# Patient Record
Sex: MALE | NOT HISPANIC OR LATINO | Employment: FULL TIME | URBAN - METROPOLITAN AREA
[De-identification: names, ages, dates, MRNs, and addresses within clinical notes are randomized per-mention and may not be internally consistent; named-entity substitution may affect disease eponyms.]

---

## 2024-03-27 NOTE — PROGRESS NOTES
There are no diagnoses linked to this encounter.   Rectal pain  Patient presents for evaluation of rectal pain.  Patient notes that sometimes for up to a few hours after bowel movements he has some degree of rectal pain and discomfort.  He describes this as a dull ache.  This goes away with time.  This is exacerbated by having bowel movements.  Patient notes occasional bleeding but this is quite minimal.  Also notes intermittent burning and itching with wiping.  Examination is largely unremarkable.  He does have some levator level tenderness.  No gross mass lesions noted on examination to include anoscopy.    In terms of his symptoms, it is difficult to tell if he has a combination of pruritic and pelvic floor symptoms.  He has no surgically correctable lesion on today's exam.  Given it has been a a while since his most recent colonoscopy, this is recommended to rule out more proximal pathology.  I have also offered him topical Calmoseptine for his perianal symptoms of burning and itching.  Risks and benefits of colonoscopy reviewed with patient to include, but not be limited to: anesthesia, bleeding, missed lesion and perforation requiring surgery.  Patient consents to procedure.      MARVEL  Angel Luis Hamilton is a New patent here for Rectal pain that he has had for a few months after bowel movement. He reports 1 bowel movement daily that is soft and formed     His last colonoscopy was 10 yrs ago.      No past medical history on file.  No past surgical history on file.    Current Outpatient Medications:     hydroCHLOROthiazide 25 mg tablet, Take 25 mg by mouth daily, Disp: , Rfl:     losartan (COZAAR) 50 mg tablet, Take 50 mg by mouth daily, Disp: , Rfl:     pantoprazole (PROTONIX) 40 mg tablet, Take 40 mg by mouth daily, Disp: , Rfl:   Allergies as of 04/01/2024    (No Known Allergies)     Review of Systems   Gastrointestinal:  Positive for rectal pain.   All other systems reviewed and are negative.    There were no  vitals filed for this visit.  Physical Exam  Constitutional:       Appearance: Normal appearance.   HENT:      Head: Normocephalic and atraumatic.      Mouth/Throat:      Mouth: Mucous membranes are moist.   Eyes:      Extraocular Movements: Extraocular movements intact.      Pupils: Pupils are equal, round, and reactive to light.   Pulmonary:      Effort: Pulmonary effort is normal.   Musculoskeletal:         General: Normal range of motion.   Skin:     General: Skin is warm and dry.   Neurological:      General: No focal deficit present.      Mental Status: He is alert and oriented to person, place, and time.   Psychiatric:         Mood and Affect: Mood normal.         Behavior: Behavior normal.         Thought Content: Thought content normal.         Judgment: Judgment normal.     Lower Endoscopy    Date/Time: 4/1/2024 10:40 AM    Performed by: Magdaleno Holguin MD  Authorized by: Magdaleno Holguin MD    Verbal consent obtained?: Yes    Risks and benefits: Risks, benefits and alternatives were discussed    Consent given by:  Patient  Indications: rectal irritation and rectal pain    Patient sedated: No    Scope type:  Anoscope  External exam performed: Yes    Pilonidal sinus tract: No    Pilonidal cyst: No    Pilonidal tenderness: No    Perianal skin tags: No    Perirectal warts: No    Perianal maceration: Yes    Perianal induration: No    Perianal erythema: No    External hemorrhoids: No    Digital exam performed: Yes    Laxity of anal sphincter: No    Prostate tenderness: No    Internal hemorrhoids: Yes    Prolapsed: No    Intraluminal mass: No    Inflammation: No    Anal fissures: No    Anal fistulae: No    Anal stricture: No    Abscess: No    Procedure termination:  Procedure complete  Patient tolerance:  Patient tolerated the procedure well with no immediate complications

## 2024-04-01 ENCOUNTER — PREP FOR PROCEDURE (OUTPATIENT)
Age: 64
End: 2024-04-01

## 2024-04-01 ENCOUNTER — OFFICE VISIT (OUTPATIENT)
Age: 64
End: 2024-04-01
Payer: COMMERCIAL

## 2024-04-01 ENCOUNTER — TELEPHONE (OUTPATIENT)
Age: 64
End: 2024-04-01

## 2024-04-01 VITALS — BODY MASS INDEX: 31.36 KG/M2 | HEIGHT: 71 IN | WEIGHT: 224 LBS

## 2024-04-01 DIAGNOSIS — K62.89 RECTAL PAIN: Primary | ICD-10-CM

## 2024-04-01 PROCEDURE — 99243 OFF/OP CNSLTJ NEW/EST LOW 30: CPT | Performed by: COLON & RECTAL SURGERY

## 2024-04-01 PROCEDURE — 46600 DIAGNOSTIC ANOSCOPY SPX: CPT | Performed by: COLON & RECTAL SURGERY

## 2024-04-01 RX ORDER — PANTOPRAZOLE SODIUM 40 MG/1
40 TABLET, DELAYED RELEASE ORAL DAILY
COMMUNITY

## 2024-04-01 RX ORDER — HYDROCHLOROTHIAZIDE 25 MG/1
25 TABLET ORAL DAILY
COMMUNITY

## 2024-04-01 RX ORDER — LOSARTAN POTASSIUM 50 MG/1
50 TABLET ORAL DAILY
COMMUNITY

## 2024-04-01 NOTE — ASSESSMENT & PLAN NOTE
Patient presents for evaluation of rectal pain.  Patient notes that sometimes for up to a few hours after bowel movements he has some degree of rectal pain and discomfort.  He describes this as a dull ache.  This goes away with time.  This is exacerbated by having bowel movements.  Patient notes occasional bleeding but this is quite minimal.  Also notes intermittent burning and itching with wiping.  Examination is largely unremarkable.  He does have some levator level tenderness.  No gross mass lesions noted on examination to include anoscopy.    In terms of his symptoms, it is difficult to tell if he has a combination of pruritic and pelvic floor symptoms.  He has no surgically correctable lesion on today's exam.  Given it has been a a while since his most recent colonoscopy, this is recommended to rule out more proximal pathology.  I have also offered him topical Calmoseptine for his perianal symptoms of burning and itching.  Risks and benefits of colonoscopy reviewed with patient to include, but not be limited to: anesthesia, bleeding, missed lesion and perforation requiring surgery.  Patient consents to procedure.

## 2024-04-01 NOTE — TELEPHONE ENCOUNTER
DB OV 4/1 rectal pain, last fc 10 yrs ago, BMI 31       Scheduled DB 4/15 TREC   Handed PW to patient

## 2024-04-01 NOTE — LETTER
Angel Luis Qaqish  [address] [address]        Your Colonoscopy Procedure has been scheduled at:    [facility] [place]  [address] [address]      The Date of your Procedure is: April 15, 2024    The Facility will call you 1-2 days prior to your procedure with your arrival time.    Dr. Magdaleno Holguin  will be performing the procedure.   Please bring to your procedure at [facility]:              1. Insurance Cards and referrals if required by your insurance company              2. Valid Photo ID              3. Power of  Form if required              4. Current list of medications with dose, route, and frequency.     Use the bowel preparation as directed.    Check with your family doctor if you are taking a blood thinner (Coumadin, Plavix, Xarelto, Pradaxa, Gingko biloba, Ginseng, Feverfew, Ceci's Wort).  We suggest stopping these for 3 days.  Special instructions may be needed if you are taking aspirin or any aspirin-containing medication.  Check with your family physician.    If you are on DIABETIC MEDICATION (tablets or insulin) your doctor may make changes in your preparation.      Feel free to call the physician's office to answer any questions or address any concerns you have regarding your procedure or preparation.  A nurse will be happy to assist you.      Because anesthesia is given to you during the procedure, the center requires that you be discharged under supervision of an adult to take you home after the procedure is performed.  Public Transportation such as VAST, BUS, TAXI is Not Acceptable.    It is important you notify our office of any insurance changes prior to your procedure and, if necessary, supply us with referrals from your primary care physician.                  COLONOSCOPY PREPARATION INSTRUCTIONS    Purchase (prescription not required):  · 238 gram bottle of Miralax® (Glycolax®)  · 4 Dulcolax® (Bisacodyl) Laxative  Tablets  · 64 oz. bottle of Gatorade® or your preference of a non-carbonated clear liquid - NOT RED OR PURPLE     One Day Prior to Colonoscopy Procedure  · Nothing to eat the day before your procedure, only clear liquids.  · It is important that you drink plenty of clear liquids throughout the day to prevent dehydration.    Clear Liquids include:  o Water/Iced Tea/Lemonade/Gatorade®/Black Coffee or tea (no milk or creamers  o Soft drinks: orange, ginger ale, cola, Pepsi®, Sprite®, 7Up®  o Polo-Aid® (lemonade or orange flavors only)  o Strained fruit juices without pulp such as apple, white grape, white cranberry  o Jell-O®, lemon, lime or orange (no fruit or toppings)  o Popsicles, Italian Ice (No Ice Cream, sherbets, or fruit bars)  o Chicken or beef bouillon/broth  DO NOT EAT OR DRINK ANYTHING RED OR PURPLE  DO NOT DRINK ANY ALCOHOLIC BEVERAGES  DIABETIC PATIENTS: Consult your physician    At 4:00 pm, take (2) Dulcolax® (Bisacodyl) Laxative Tablets. Swallow the tablets whole with an 8 oz. glass of water. At 8:00 pm, take the additional (2) Dulcolax® (Bisacodyl) Laxative Tablets with 8 oz. of water. The package may direct you not to exceed (2) tablets at any time but for the purpose of this examination, you should take (4) total.    Mix the 238 gm of Miralax® in 64 oz. of Gatorade® and shake the solution until the Miralax® is dissolved. You will drink half (32 oz) of this solution this evening, beginning between 4 and 6 o'clock. Drink 8 oz glassfuls at your own pace. It may take several hours to drink the solution.    Remember to stay close to toilet facilities.    DAY OF COLONOSCOPY PROCEDURE    Five (5) hours before your procedure, drink the other half (32 oz) of the Miralax®/Gatorade® mixture within a two (2) hour period. This may require you to get up very early if you are scheduled for an early procedure.    NOTHING IS TO BE TAKEN BY MOUTH 3 HOURS PRIOR TO PROCEDURE.     If you use an inhaler, please bring it  with you to your procedure.

## 2024-04-03 ENCOUNTER — ANESTHESIA EVENT (OUTPATIENT)
Dept: ANESTHESIOLOGY | Facility: AMBULATORY SURGERY CENTER | Age: 64
End: 2024-04-03

## 2024-04-03 ENCOUNTER — ANESTHESIA (OUTPATIENT)
Dept: ANESTHESIOLOGY | Facility: AMBULATORY SURGERY CENTER | Age: 64
End: 2024-04-03

## 2024-04-13 ENCOUNTER — ANESTHESIA (OUTPATIENT)
Dept: GASTROENTEROLOGY | Facility: AMBULATORY SURGERY CENTER | Age: 64
End: 2024-04-13

## 2024-04-13 ENCOUNTER — ANESTHESIA EVENT (OUTPATIENT)
Dept: GASTROENTEROLOGY | Facility: AMBULATORY SURGERY CENTER | Age: 64
End: 2024-04-13

## 2024-04-14 NOTE — ANESTHESIA PREPROCEDURE EVALUATION
Procedure:  PRE-OP ONLY    Relevant Problems   No relevant active problems      Hx rectal pain  BMI 31  HTN  GERD         Anesthesia Plan  ASA Score- 2     Anesthesia Type- IV sedation with anesthesia with ASA Monitors.         Additional Monitors:     Airway Plan:            Plan Factors-    Chart reviewed.    Patient summary reviewed.    Patient is not a current smoker.              Induction- intravenous.    Postoperative Plan-     Informed Consent-

## 2024-04-15 ENCOUNTER — HOSPITAL ENCOUNTER (OUTPATIENT)
Dept: GASTROENTEROLOGY | Facility: AMBULATORY SURGERY CENTER | Age: 64
Discharge: HOME | End: 2024-04-15
Attending: COLON & RECTAL SURGERY
Payer: COMMERCIAL

## 2024-04-15 ENCOUNTER — ANESTHESIA EVENT (OUTPATIENT)
Dept: GASTROENTEROLOGY | Facility: AMBULATORY SURGERY CENTER | Age: 64
End: 2024-04-15

## 2024-04-15 ENCOUNTER — ANESTHESIA (OUTPATIENT)
Dept: GASTROENTEROLOGY | Facility: AMBULATORY SURGERY CENTER | Age: 64
End: 2024-04-15

## 2024-04-15 VITALS
SYSTOLIC BLOOD PRESSURE: 141 MMHG | WEIGHT: 225 LBS | HEIGHT: 71 IN | HEART RATE: 63 BPM | TEMPERATURE: 97 F | BODY MASS INDEX: 31.5 KG/M2 | OXYGEN SATURATION: 98 % | DIASTOLIC BLOOD PRESSURE: 85 MMHG | RESPIRATION RATE: 18 BRPM

## 2024-04-15 DIAGNOSIS — K62.89 RECTAL PAIN: ICD-10-CM

## 2024-04-15 PROCEDURE — 45380 COLONOSCOPY AND BIOPSY: CPT | Performed by: COLON & RECTAL SURGERY

## 2024-04-15 PROCEDURE — 88305 TISSUE EXAM BY PATHOLOGIST: CPT | Performed by: PATHOLOGY

## 2024-04-15 RX ORDER — SODIUM CHLORIDE, SODIUM LACTATE, POTASSIUM CHLORIDE, CALCIUM CHLORIDE 600; 310; 30; 20 MG/100ML; MG/100ML; MG/100ML; MG/100ML
INJECTION, SOLUTION INTRAVENOUS CONTINUOUS PRN
Status: DISCONTINUED | OUTPATIENT
Start: 2024-04-15 | End: 2024-04-15

## 2024-04-15 RX ORDER — LIDOCAINE HYDROCHLORIDE 20 MG/ML
INJECTION, SOLUTION EPIDURAL; INFILTRATION; INTRACAUDAL; PERINEURAL AS NEEDED
Status: DISCONTINUED | OUTPATIENT
Start: 2024-04-15 | End: 2024-04-15

## 2024-04-15 RX ORDER — PROPOFOL 10 MG/ML
INJECTION, EMULSION INTRAVENOUS AS NEEDED
Status: DISCONTINUED | OUTPATIENT
Start: 2024-04-15 | End: 2024-04-15

## 2024-04-15 RX ADMIN — PROPOFOL 100 MG: 10 INJECTION, EMULSION INTRAVENOUS at 07:25

## 2024-04-15 RX ADMIN — PROPOFOL 50 MG: 10 INJECTION, EMULSION INTRAVENOUS at 07:28

## 2024-04-15 RX ADMIN — PROPOFOL 20 MG: 10 INJECTION, EMULSION INTRAVENOUS at 07:34

## 2024-04-15 RX ADMIN — PROPOFOL 30 MG: 10 INJECTION, EMULSION INTRAVENOUS at 07:31

## 2024-04-15 RX ADMIN — LIDOCAINE HYDROCHLORIDE 100 MG: 20 INJECTION, SOLUTION EPIDURAL; INFILTRATION; INTRACAUDAL; PERINEURAL at 07:25

## 2024-04-15 RX ADMIN — SODIUM CHLORIDE, SODIUM LACTATE, POTASSIUM CHLORIDE, CALCIUM CHLORIDE: 600; 310; 30; 20 INJECTION, SOLUTION INTRAVENOUS at 07:22

## 2024-04-15 NOTE — ANESTHESIA PREPROCEDURE EVALUATION
Procedure:  COLONOSCOPY    Relevant Problems   No relevant active problems      Hx rectal pain  BMI 31  HTN  GERD    Physical Exam    Airway    Mallampati score: III  TM Distance: >3 FB  Neck ROM: full     Dental   No notable dental hx     Cardiovascular      Pulmonary      Other Findings        Anesthesia Plan  ASA Score- 2     Anesthesia Type- IV sedation with anesthesia with ASA Monitors.         Additional Monitors:     Airway Plan:            Plan Factors-    Chart reviewed.    Patient summary reviewed.    Patient is not a current smoker.              Induction- intravenous.    Postoperative Plan-     Informed Consent- Anesthetic plan and risks discussed with patient.  I personally reviewed this patient with the CRNA. Discussed and agreed on the Anesthesia Plan with the CRNA..

## 2024-04-15 NOTE — ANESTHESIA POSTPROCEDURE EVALUATION
Post-Op Assessment Note    CV Status:  Stable    Pain management: adequate       Mental Status:  Sleepy   Hydration Status:  Euvolemic   PONV Controlled:  Controlled   Airway Patency:  Patent     Post Op Vitals Reviewed: Yes    No anethesia notable event occurred.    Staff: CRNA               BP   120/70   Temp   98   Pulse  64   Resp   16   SpO2   98%

## 2024-04-15 NOTE — H&P
"History and Physical   Colon and Rectal Surgery   Angel Luis Hamilton 63 y.o. male MRN: [id number]  Unit/Bed#:  Encounter: [id number]  04/15/24   @NOW    No chief complaint on file.        History of Present Illness   HPI:  Angel Luis Hamilton is a 63 y.o. male who presents for evaluation of rectal pain.      Historical Information   Past Medical History:   Diagnosis Date    GERD (gastroesophageal reflux disease)     Hypertension     Rectal bleeding      Past Surgical History:   Procedure Laterality Date    CERVICAL DISC SURGERY      HEMORROIDECTOMY      ORIF TOE FRACTURE Right        Meds/Allergies     (Not in a hospital admission)        Current Outpatient Medications:     hydroCHLOROthiazide 25 mg tablet, Take 25 mg by mouth daily, Disp: , Rfl:     losartan (COZAAR) 50 mg tablet, Take 50 mg by mouth daily, Disp: , Rfl:     pantoprazole (PROTONIX) 40 mg tablet, Take 40 mg by mouth daily, Disp: , Rfl:     No Known Allergies      Social History   Social History     Substance and Sexual Activity   Alcohol Use Yes    Alcohol/week: 7.0 standard drinks of alcohol    Types: 7 Shots of liquor per week     Social History     Substance and Sexual Activity   Drug Use Never     Social History     Tobacco Use   Smoking Status Former    Current packs/day: 2.00    Types: Cigarettes, Cigars   Smokeless Tobacco Never   Tobacco Comments    Quit 16 years ago         Family History: History reviewed. No pertinent family history.      Objective     Current Vitals:   Blood Pressure: 158/85 (04/15/24 0710)  Pulse: 66 (nsr) (04/15/24 0710)  Temperature: (!) 97 °F (36.1 °C) (04/15/24 0710)  Temp Source: Skin (04/15/24 0710)  Respirations: 18 (04/15/24 0710)  Height: 5' 11\" (180.3 cm) (04/15/24 0710)  Weight - Scale: 102 kg (225 lb) (04/15/24 0710)  SpO2: 99 % (04/15/24 0710)  No intake or output data in the 24 hours ending 04/15/24 0719    Physical Exam:  General:  Resting comfortably in bed   Eyes:Sclera anicteric  ENT: Trachea midline  Pulm:  " Symmetric chest raise.  No respiratory Distress  CV:  Regular on monitor  Abdomen:  Soft NT ND  Extremities:  No clubbing/ cyanosis/ edema    Lab Results: I have personally reviewed pertinent lab results.    Imaging: I have personally reviewed pertinent reports.        ASSESSMENT:  Angel Luis Hamilton is a 63 y.o. male who presents for outpatient colonoscopy.      PLAN:  For colonoscopy    Risks/ Benefits reviewed to include but not limited to anesthesia, bleeding, missed lesions, and colonoscopic perforation requiring surgery.

## 2024-04-18 PROCEDURE — 88305 TISSUE EXAM BY PATHOLOGIST: CPT | Performed by: PATHOLOGY

## 2024-05-17 ENCOUNTER — OFFICE VISIT (OUTPATIENT)
Age: 64
End: 2024-05-17
Payer: COMMERCIAL

## 2024-05-17 VITALS
WEIGHT: 223 LBS | HEIGHT: 71 IN | BODY MASS INDEX: 31.22 KG/M2 | DIASTOLIC BLOOD PRESSURE: 72 MMHG | SYSTOLIC BLOOD PRESSURE: 140 MMHG

## 2024-05-17 DIAGNOSIS — K62.89 RECTAL PAIN: Primary | ICD-10-CM

## 2024-05-17 PROCEDURE — 99213 OFFICE O/P EST LOW 20 MIN: CPT | Performed by: COLON & RECTAL SURGERY

## 2024-05-17 NOTE — ASSESSMENT & PLAN NOTE
Patient presents for follow-up of rectal pain.  He underwent colonoscopy last month that showed no concerning lesions.  He notes he still has intermittent anorectal pain.  This is worse in the morning with bowel movements but subsides over time.  He notes his bowel movements are soft and formed.  He has occasional burning and itching but his predominant pain feels more like a spasm.  He notes it does harjit later in the day particularly if he is doing other active work.    Based upon his lack of findings on recent exams, I suspect he has more pelvic floor spasm or proctalgia fugax than other actionable surgically treatable lesions.  We discussed that full workup of this could include MRI of the pelvis to rule out perirectal concerns.  Care could involve warm soaks, bowel regimen, heating pad, nonsteroidal anti-inflammatory medications, pelvic floor physical therapy.  Patient wishes to continue as is for now but will call if symptoms

## 2024-05-17 NOTE — PROGRESS NOTES
Diagnoses and all orders for this visit:    Rectal pain       Rectal pain  Patient presents for follow-up of rectal pain.  He underwent colonoscopy last month that showed no concerning lesions.  He notes he still has intermittent anorectal pain.  This is worse in the morning with bowel movements but subsides over time.  He notes his bowel movements are soft and formed.  He has occasional burning and itching but his predominant pain feels more like a spasm.  He notes it does harjit later in the day particularly if he is doing other active work.    Based upon his lack of findings on recent exams, I suspect he has more pelvic floor spasm or proctalgia fugax than other actionable surgically treatable lesions.  We discussed that full workup of this could include MRI of the pelvis to rule out perirectal concerns.  Care could involve warm soaks, bowel regimen, heating pad, nonsteroidal anti-inflammatory medications, pelvic floor physical therapy.  Patient wishes to continue as is for now but will call if symptoms      HPI    Angel Luis Hamilton is here for follow up after colonoscopy.   Soreness in bowel area. Normal bowel movements once a day. Pain still the same.     Last colonoscopy performed on 4/15/2024 showed:   Indication: Rectal pain.  Impression:    1) Subcentimeter polyp in the rectosigmoid was removed with cold forceps biopsy. 2) Normal.   5 year recall. Inadequate bowel preparation.     Colonoscopy pathology:  A. Rectosigmoid colon polyp x 1 (biopsy):  - Hyperplastic polyp  - Negative for dysplasia (deeper sections examined)          Past Medical History:   Diagnosis Date    GERD (gastroesophageal reflux disease)     Hypertension     Rectal bleeding      Past Surgical History:   Procedure Laterality Date    CERVICAL DISC SURGERY      HEMORROIDECTOMY      ORIF TOE FRACTURE Right        Current Outpatient Medications:     hydroCHLOROthiazide 25 mg tablet, Take 25 mg by mouth daily, Disp: , Rfl:     losartan (COZAAR) 50  mg tablet, Take 50 mg by mouth daily, Disp: , Rfl:     pantoprazole (PROTONIX) 40 mg tablet, Take 40 mg by mouth daily, Disp: , Rfl:   Allergies as of 05/17/2024    (No Known Allergies)     Review of Systems   All other systems reviewed and are negative.    Vitals:    05/17/24 1545   BP: 140/72     Physical Exam  Constitutional:       Appearance: Normal appearance.   HENT:      Head: Normocephalic and atraumatic.   Eyes:      Extraocular Movements: Extraocular movements intact.      Pupils: Pupils are equal, round, and reactive to light.   Musculoskeletal:         General: Normal range of motion.   Skin:     General: Skin is warm and dry.   Neurological:      General: No focal deficit present.      Mental Status: He is alert and oriented to person, place, and time.   Psychiatric:         Mood and Affect: Mood normal.         Behavior: Behavior normal.         Thought Content: Thought content normal.         Judgment: Judgment normal.

## 2024-11-18 ENCOUNTER — TELEPHONE (OUTPATIENT)
Dept: SURGERY | Facility: CLINIC | Age: 64
End: 2024-11-18
Payer: COMMERCIAL

## 2024-11-18 NOTE — TELEPHONE ENCOUNTER
Called patient to assist in making office appointment with Dr. Campo and asked him to call our office.

## 2024-12-30 ENCOUNTER — OFFICE VISIT (OUTPATIENT)
Dept: SURGERY | Facility: CLINIC | Age: 64
End: 2024-12-30
Payer: COMMERCIAL

## 2024-12-30 VITALS
SYSTOLIC BLOOD PRESSURE: 133 MMHG | HEART RATE: 88 BPM | DIASTOLIC BLOOD PRESSURE: 83 MMHG | WEIGHT: 231 LBS | TEMPERATURE: 97.1 F | OXYGEN SATURATION: 97 % | BODY MASS INDEX: 32.34 KG/M2 | HEIGHT: 71 IN

## 2024-12-30 DIAGNOSIS — K60.2 ANAL FISSURE: Primary | ICD-10-CM

## 2024-12-30 PROCEDURE — 3008F BODY MASS INDEX DOCD: CPT | Performed by: COLON & RECTAL SURGERY

## 2024-12-30 PROCEDURE — 99203 OFFICE O/P NEW LOW 30 MIN: CPT | Performed by: COLON & RECTAL SURGERY

## 2024-12-30 RX ORDER — HYDROCHLOROTHIAZIDE 50 MG/1
TABLET ORAL
COMMUNITY
Start: 2024-12-27

## 2024-12-30 RX ORDER — IBUPROFEN 200 MG
200 TABLET ORAL EVERY 6 HOURS PRN
COMMUNITY

## 2024-12-30 RX ORDER — LOSARTAN POTASSIUM 50 MG/1
50 TABLET ORAL DAILY
COMMUNITY

## 2024-12-30 RX ORDER — PHENOL/SODIUM PHENOLATE
1 AEROSOL, SPRAY (ML) MUCOUS MEMBRANE DAILY
COMMUNITY

## 2024-12-30 NOTE — LETTER
December 30, 2024     Rhett Alonzo MD  [address] [address] [address]    Patient: Rose Ortez  YOB: 1960  Date of Visit: 12/30/2024      Dear Dr. Alonzo:    Thank you for referring Rose Ortez to me for evaluation. Below are my notes for this consultation.    If you have questions, please do not hesitate to call me. I look forward to following your patient along with you.         Sincerely,        Efraín Campo MD        CC: Efraín Molina MD  12/30/2024  6:46 PM  Sign when Signing Visit  Colon and Rectal Surgery Consult    Subjective    Rose Ortez is a 64 y.o. male who presents for evaluation of anal pain.    Since May, he has been having significant discomfort with bowel movements.  He feels a chronic pressure and sense of incomplete evacuation.  He moves his bowels on a daily basis.  He is not have any blood with bowel movements.  He gets a burning and throbbing sensation after bowel movements that last for several hours afterwards.  At times he feels discomfort as the stool comes out.    He had a colonoscopy by Dr. Jabari Schwartz at [facility].  A hyperplastic rectal polyp was removed.  He noted a normal digital exam.  Retroflexion was normal.    He has some history of hemorrhoid surgery in the past.    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    Medical History: History reviewed. No pertinent past medical history.    Surgical History: No past surgical history on file.    Social History:   Social History     Tobacco Use   • Smoking status: Former     Types: Cigarettes   • Smokeless tobacco: Never   Vaping Use   • Vaping status: Never Used   Substance Use Topics   • Alcohol use: Yes     Alcohol/week: 1.0 standard drink of alcohol     Types: 1 Cans of beer per week   • Drug use: Never       Family History:   Family History   Problem Relation Name Age of Onset   • Hypertension Biological Mother     •  "Hypertension Biological Father         Allergies: Patient has no known allergies.    Current Medications:  Current Outpatient Medications   Medication Sig Dispense Refill   • hydrochlorothiazide (HYDRODIURIL) 50 mg tablet      • ibuprofen (MOTRIN) 200 mg tablet Take 200 mg by mouth every 6 hours as needed.     • losartan (COZAAR) 50 mg tablet Take 50 mg by mouth daily.     • NIFEdipine 0.2 % ointment Apply 3 times daily as directed 60 g 0   • omeprazole 20 mg tablet Take 1 tablet by mouth daily.       No current facility-administered medications for this visit.       Review of Systems  Review of Systems    Objective    Physicial Exam  Visit Vitals  /83 (BP Location: Left upper arm, Patient Position: Sitting)   Pulse 88   Temp 36.2 °C (97.1 °F) (Temporal)   Ht 1.803 m (5' 11\")   Wt 105 kg (231 lb)   SpO2 97%   BMI 32.22 kg/m²       Physical Exam  Vitals reviewed.   Constitutional:       Appearance: Normal appearance. He is well-developed.   HENT:      Head: Normocephalic and atraumatic.   Eyes:      Pupils: Pupils are equal, round, and reactive to light.   Cardiovascular:      Rate and Rhythm: Normal rate and regular rhythm.      Heart sounds: Normal heart sounds. No murmur heard.  Pulmonary:      Effort: Pulmonary effort is normal. No respiratory distress.      Breath sounds: Normal breath sounds. No wheezing.   Abdominal:      General: There is no distension.      Palpations: Abdomen is soft. There is no mass.      Tenderness: There is no abdominal tenderness.      Hernia: No hernia is present.   Genitourinary:     Comments: There is a scar just to the right of the posterior midline.  There is some sclerotic changes at the posterior area and a prominent tag but I do not see an obvious fissure externally.  On digital examination he is tender in the anal canal and has significant sphincter spasm.  He seems to be more tender posteriorly but it was difficult to tell.  He did not tolerate anoscopy very well but I " was not able to see an actual fissure.  The tag appears to be prominent hypertrophied papilla.  Musculoskeletal:         General: No tenderness. Normal range of motion.      Cervical back: Normal range of motion and neck supple.   Lymphadenopathy:      Cervical: No cervical adenopathy.   Skin:     General: Skin is warm and dry.      Capillary Refill: Capillary refill takes less than 2 seconds.      Findings: No rash.   Neurological:      Mental Status: He is alert and oriented to person, place, and time.      Cranial Nerves: No cranial nerve deficit.   Psychiatric:         Mood and Affect: Mood normal.         Behavior: Behavior normal.           Labs  No new labs.    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  He certainly has high anal tone and sphincter spasm.  I started him on conservative therapy with stool softeners, fiber supplements, sitz baths and a topical smooth muscle relaxant, specifically nifedipine. I will reevaluate in 6 weeks. If he is not having significant improvement in his symptoms, we may need an examination under anesthesia with possible sphincterotomy.         Relevant Medications    NIFEdipine 0.2 % ointment             Efraín Campo MD

## 2024-12-30 NOTE — PROGRESS NOTES
Colon and Rectal Surgery Consult    Subjective     Rose Ortez is a 64 y.o. male who presents for evaluation of anal pain.    Since May, he has been having significant discomfort with bowel movements.  He feels a chronic pressure and sense of incomplete evacuation.  He moves his bowels on a daily basis.  He is not have any blood with bowel movements.  He gets a burning and throbbing sensation after bowel movements that last for several hours afterwards.  At times he feels discomfort as the stool comes out.    He had a colonoscopy by Dr. Jabari Schwartz at [facility].  A hyperplastic rectal polyp was removed.  He noted a normal digital exam.  Retroflexion was normal.    He has some history of hemorrhoid surgery in the past.    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    Medical History: History reviewed. No pertinent past medical history.    Surgical History: No past surgical history on file.    Social History:   Social History     Tobacco Use    Smoking status: Former     Types: Cigarettes    Smokeless tobacco: Never   Vaping Use    Vaping status: Never Used   Substance Use Topics    Alcohol use: Yes     Alcohol/week: 1.0 standard drink of alcohol     Types: 1 Cans of beer per week    Drug use: Never       Family History:   Family History   Problem Relation Name Age of Onset    Hypertension Biological Mother      Hypertension Biological Father         Allergies: Patient has no known allergies.    Current Medications:  Current Outpatient Medications   Medication Sig Dispense Refill    hydrochlorothiazide (HYDRODIURIL) 50 mg tablet       ibuprofen (MOTRIN) 200 mg tablet Take 200 mg by mouth every 6 hours as needed.      losartan (COZAAR) 50 mg tablet Take 50 mg by mouth daily.      NIFEdipine 0.2 % ointment Apply 3 times daily as directed 60 g 0    omeprazole 20 mg tablet Take 1 tablet by mouth daily.       No current facility-administered medications for this visit.  "      Review of Systems  Review of Systems    Objective     Physicial Exam  Visit Vitals  /83 (BP Location: Left upper arm, Patient Position: Sitting)   Pulse 88   Temp 36.2 °C (97.1 °F) (Temporal)   Ht 1.803 m (5' 11\")   Wt 105 kg (231 lb)   SpO2 97%   BMI 32.22 kg/m²       Physical Exam  Vitals reviewed.   Constitutional:       Appearance: Normal appearance. He is well-developed.   HENT:      Head: Normocephalic and atraumatic.   Eyes:      Pupils: Pupils are equal, round, and reactive to light.   Cardiovascular:      Rate and Rhythm: Normal rate and regular rhythm.      Heart sounds: Normal heart sounds. No murmur heard.  Pulmonary:      Effort: Pulmonary effort is normal. No respiratory distress.      Breath sounds: Normal breath sounds. No wheezing.   Abdominal:      General: There is no distension.      Palpations: Abdomen is soft. There is no mass.      Tenderness: There is no abdominal tenderness.      Hernia: No hernia is present.   Genitourinary:     Comments: There is a scar just to the right of the posterior midline.  There is some sclerotic changes at the posterior area and a prominent tag but I do not see an obvious fissure externally.  On digital examination he is tender in the anal canal and has significant sphincter spasm.  He seems to be more tender posteriorly but it was difficult to tell.  He did not tolerate anoscopy very well but I was not able to see an actual fissure.  The tag appears to be prominent hypertrophied papilla.  Musculoskeletal:         General: No tenderness. Normal range of motion.      Cervical back: Normal range of motion and neck supple.   Lymphadenopathy:      Cervical: No cervical adenopathy.   Skin:     General: Skin is warm and dry.      Capillary Refill: Capillary refill takes less than 2 seconds.      Findings: No rash.   Neurological:      Mental Status: He is alert and oriented to person, place, and time.      Cranial Nerves: No cranial nerve deficit. "   Psychiatric:         Mood and Affect: Mood normal.         Behavior: Behavior normal.           Labs  No new labs.    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  He certainly has high anal tone and sphincter spasm.  I started him on conservative therapy with stool softeners, fiber supplements, sitz baths and a topical smooth muscle relaxant, specifically nifedipine. I will reevaluate in 6 weeks. If he is not having significant improvement in his symptoms, we may need an examination under anesthesia with possible sphincterotomy.         Relevant Medications    NIFEdipine 0.2 % ointment             Efraín Campo MD

## 2024-12-30 NOTE — ASSESSMENT & PLAN NOTE
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  He certainly has high anal tone and sphincter spasm.  I started him on conservative therapy with stool softeners, fiber supplements, sitz baths and a topical smooth muscle relaxant, specifically nifedipine. I will reevaluate in 6 weeks. If he is not having significant improvement in his symptoms, we may need an examination under anesthesia with possible sphincterotomy.

## 2024-12-30 NOTE — PATIENT INSTRUCTIONS
Medical therapy instructions for Anal Fissure    Fiber supplements: Metamucil, Citrucel, Konsyl, Benefiber.  The idea is to keep the stools soft yet formed and easy to pass. It is important to drink plenty of fluids, ie. 5-6 8oz glasses of water daily  Laxatives / Stool softener: Miralax 1 capful (17 g) is very helpful. Try taking at night to have a good bowel movement in the the morning.  Sitz baths: three times a day and after bowel movements. No longer than 15 minutes. The bathtub is fine, or you may use a Sitz tub that rests on the toilet (from the pharmacy). Hot water (not scalding!) only - no salts  Medication: smooth muscle relaxants  Nifedapine Ointment 0.2%: Apply a pea-sized amount to the skin around the anus and work it into the anal canal if you can tolerate it three times a day. No problem with headaches.  Nitroglycerine Ointment 0.2%: Take Tylenol or advil 30 minutes prior to treatment. Apply a pea-sized amount to the skin around the anus up to the dimple of the opening and rub in.  Start once a day in the evening for 3 days, then twice a day for 3 days, then three times a day. The major side effect is headaches which usually get better with time as you use the medication    If there is not improvement over a 2-3 week period please return to see me for re-evaluation. If you cannot tolerate Nitroglycerine due to headaches, please call.      FISSURE    The anal canal is a short tube surrounded by muscle at the end of your rectum. The rectum is the bottom section of your colon (large intestine). An anal fissure (also called fissure-in-ano) is a small rip or tear in the lining of the anal canal. Fissures are common, but are often confused with other anal conditions, such as hemorrhoids.       CAUSES OF ANAL FISSURE  Fissures are usually caused by trauma to the inner lining of the anus from a bowel movement or other stretching of the anal canal. This can be due to a hard, dry bowel movement or loose,  frequent bowel movements. Patients with a tight anal sphincter muscle are more likely to develop anal fissures. Less common causes of fissures include inflammatory bowel disease, anal infections, or tumors.     SYMPTOMS  Anal fissures typically cause a sharp pain that starts with the passage of stool. This pain may last several minutes to a few hours. As a result, many patients may try not to have bowel movements to prevent pain.  Other symptoms include:  Bright red blood on the stool or toilet paper after a bowel movement  A small lump or skin tag on the skin near the anal fissure (more common when chronic)    NONSURGICAL TREATMENT  Your physician will discuss the benefits and side effects of treatments.  Treatment includes:  A high-fiber diet and over-the-counter fiber supplements (25-35 grams of fiber/day) to make stools soft, formed, and bulky.  Over-the-counter stool softeners to make stools easier to pass.  Drinking more water to help prevent hard stools and aid in healing.  Warm tub baths (sitz baths) for 10 to 20 minutes, a few times per day (especially after bowel movements to soothe the area and help relax anal sphincter muscles). This is thought to help the healing process.  Medications, such as lidocaine, that can be applied to the skin around the anus for pain relief.  Medications such as diltiazam, nifedipine, or nitroglycerin ointment to relax the anal sphincter muscles which helps the healing process.  Narcotic pain medications are avoided because they can cause constipation which could make the situation worse.     SURGICAL TREATMENT  Although most anal fissures do not require surgery, chronic fissures are harder to treat and surgery may be the best option. The goal of surgery is to help the anal sphincter muscle relax which reduces pain and spasms, allowing the fissure to heal. Surgical options include Botulinum toxin (Botox®) injection into the anal sphincter or surgical division of an inner part  of the anal sphincter (lateral internal sphincterotomy). Your colon and rectal surgeon will find the best treatment for you and discuss the risks of surgery. Both types of surgery are typically done as same-day outpatient procedures.     POST-TREATMENT PROGNOSIS  Most patients can return to work and go back to daily activities a few days after surgery. Complete healing after both medical and surgical treatments can take 6 to 10 weeks. Even when the pain and bleeding lessen, it is important to maintain good bowel habits and eat a high-fiber diet. Continued hard or loose bowel movements, scarring, or spasm of the internal anal muscle can delay healing.  Botox® injections are associated with healing of chronic anal fissures in 50% to 80% of patients.  Sphincterotomy is successful in more than 90% of patients. Although uncommon, this procedure may affect the patient’s ability to fully control gas or bowel movements.  Fissures often come back. A fully healed fissure can come back after a hard bowel movement or trauma. Medical problems such as inflammatory bowel disease (Crohn’s disease), infections, or anal tumors can cause symptoms similar to anal fissures. If a fissure does not improve with treatment, it is important to be evaluated for other possible conditions.     CAN ANAL FISSURES LEAD TO COLON CANCER?  Anal fissures do not increase the risk of colon cancer nor cause it. However, more serious conditions can cause similar symptoms. Even when a fissure has healed completely, your colon and rectal surgeon may request other tests. A colonoscopy may be done to rule out other causes of rectal bleeding.    WHAT IS A COLON AND RECTAL SURGEON?  Colon and rectal surgeons are experts in the surgical and non-surgical treatment of diseases of the colon, rectum and anus. They have completed advanced surgical training in the treatment of these diseases, as well as full general surgical training. Board-certified colon and rectal  surgeons complete residencies in general surgery and colon and rectal surgery, and pass intensive examinations conducted by the American Board of Surgery and the American Board of Colon and Rectal Surgery. They are well versed in the treatment of both benign and malignant diseases of the colon, rectum and anus and are able to perform routine screening examinations and surgically treat conditions, if indicated to do so.    DISCLAIMER  The American Society of Colon and Rectal Surgeons is dedicated to ensuring high-quality patient care by advancing the science, prevention and management of disorders and diseases of the colon, rectum and anus. These brochures are inclusive but not prescriptive. Their purpose is to provide information on diseases and processes, rather than dictate a specific form of treatment. They are intended for the use of all practitioners, health care workers and patients who desire information about the management of the conditions addressed. It should be recognized that these brochures should not be deemed inclusive of all proper methods of care or exclusive of methods of care reasonably directed to obtain the same results. The ultimate judgment regarding the propriety of any specific procedure must be made by the physician in light of all the circumstances presented by the individual patient.     © 2012 American Society of Colon & Rectal Surgeons

## 2025-02-10 ENCOUNTER — OFFICE VISIT (OUTPATIENT)
Dept: SURGERY | Facility: CLINIC | Age: 65
End: 2025-02-10
Payer: COMMERCIAL

## 2025-02-10 VITALS
HEIGHT: 71 IN | TEMPERATURE: 97 F | WEIGHT: 223 LBS | SYSTOLIC BLOOD PRESSURE: 118 MMHG | DIASTOLIC BLOOD PRESSURE: 74 MMHG | OXYGEN SATURATION: 98 % | BODY MASS INDEX: 31.22 KG/M2 | HEART RATE: 77 BPM

## 2025-02-10 DIAGNOSIS — K60.2 ANAL FISSURE: Primary | ICD-10-CM

## 2025-02-10 PROCEDURE — 99213 OFFICE O/P EST LOW 20 MIN: CPT | Performed by: COLON & RECTAL SURGERY

## 2025-02-10 PROCEDURE — 3008F BODY MASS INDEX DOCD: CPT | Performed by: COLON & RECTAL SURGERY

## 2025-02-10 NOTE — ASSESSMENT & PLAN NOTE
Although he continues to have symptoms, his symptoms have improved significantly on therapy.  I want to continue with the nifedipine ointment and reevaluate in another 6 weeks.  He is making the progress that I hoped.  If he no longer is getting better or if his symptoms worsen, we can discuss surgery.

## 2025-02-10 NOTE — PROGRESS NOTES
"Colorectal Surgery Follow-up    Subjective     Rose Ortez is a 64 y.o. male who returns in follow-up for anal pain.  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  He is improved.  He is having pain less often with bowel movements.  He still has symptoms however.    Medical History: History reviewed. No pertinent past medical history.    Surgical History: No past surgical history on file.    Allergies: Patient has no known allergies.    Current Medications:  Current Outpatient Medications   Medication Sig Dispense Refill    hydrochlorothiazide (HYDRODIURIL) 50 mg tablet       ibuprofen (MOTRIN) 200 mg tablet Take 200 mg by mouth every 6 hours as needed.      losartan (COZAAR) 50 mg tablet Take 50 mg by mouth daily.      NIFEdipine 0.2 % ointment Apply 3 times daily as directed 60 g 0    omeprazole 20 mg tablet Take 1 tablet by mouth daily.       No current facility-administered medications for this visit.       Objective     Physicial Exam  Visit Vitals  /74 (BP Location: Left upper arm, Patient Position: Sitting)   Pulse 77   Temp 36.1 °C (97 °F) (Temporal)   Ht 1.803 m (5' 11\")   Wt 101 kg (223 lb)   SpO2 98%   BMI 31.10 kg/m²       Physical Exam  Cardiovascular:      Rate and Rhythm: Normal rate and regular rhythm.   Pulmonary:      Breath sounds: Normal breath sounds.   Abdominal:      General: There is no distension.      Palpations: Abdomen is soft. There is no mass.      Tenderness: There is no abdominal tenderness.   Genitourinary:     Comments: There is a sentinel tag just to the right of the posterior midline.  Again, I had a hard time seeing an active wound at the base of the tag but I suspect is there.  He is tender that in that location.            Assessment     Problem List Items Addressed This Visit          Digestive    Anal fissure - Primary    Current Assessment & Plan     Although he continues to have symptoms, his " symptoms have improved significantly on therapy.  I want to continue with the nifedipine ointment and reevaluate in another 6 weeks.  He is making the progress that I hoped.  If he no longer is getting better or if his symptoms worsen, we can discuss surgery.                  Efraín Campo MD

## 2025-02-10 NOTE — LETTER
"February 10, 2025     Rhett Alonzo MD  [address] [facility] [id number]    Patient: Rose Ortez  YOB: 1960  Date of Visit: 2/10/2025      Dear Dr. Alonzo:    Thank you for referring Rose Ortez to me for evaluation. Below are my notes for this consultation.    If you have questions, please do not hesitate to call me. I look forward to following your patient along with you.         Sincerely,        Efraín Campo MD        CC: No Recipients    Efraín Campo MD  2/10/2025 10:23 AM  Sign when Signing Visit  Colorectal Surgery Follow-up    Subjective    Rose Ortez is a 64 y.o. male who returns in follow-up for anal pain.  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  He is improved.  He is having pain less often with bowel movements.  He still has symptoms however.    Medical History: History reviewed. No pertinent past medical history.    Surgical History: No past surgical history on file.    Allergies: Patient has no known allergies.    Current Medications:  Current Outpatient Medications   Medication Sig Dispense Refill   • hydrochlorothiazide (HYDRODIURIL) 50 mg tablet      • ibuprofen (MOTRIN) 200 mg tablet Take 200 mg by mouth every 6 hours as needed.     • losartan (COZAAR) 50 mg tablet Take 50 mg by mouth daily.     • NIFEdipine 0.2 % ointment Apply 3 times daily as directed 60 g 0   • omeprazole 20 mg tablet Take 1 tablet by mouth daily.       No current facility-administered medications for this visit.       Objective    Physicial Exam  Visit Vitals  /74 (BP Location: Left upper arm, Patient Position: Sitting)   Pulse 77   Temp 36.1 °C (97 °F) (Temporal)   Ht 1.803 m (5' 11\")   Wt 101 kg (223 lb)   SpO2 98%   BMI 31.10 kg/m²       Physical Exam  Cardiovascular:      Rate and Rhythm: Normal rate and regular rhythm.   Pulmonary:      Breath sounds: Normal breath sounds.   Abdominal:      General: There " is no distension.      Palpations: Abdomen is soft. There is no mass.      Tenderness: There is no abdominal tenderness.   Genitourinary:     Comments: There is a sentinel tag just to the right of the posterior midline.  Again, I had a hard time seeing an active wound at the base of the tag but I suspect is there.  He is tender that in that location.            Assessment    Problem List Items Addressed This Visit          Digestive    Anal fissure - Primary    Current Assessment & Plan     Although he continues to have symptoms, his symptoms have improved significantly on therapy.  I want to continue with the nifedipine ointment and reevaluate in another 6 weeks.  He is making the progress that I hoped.  If he no longer is getting better or if his symptoms worsen, we can discuss surgery.                  Efraín Campo MD

## 2025-03-31 ENCOUNTER — HOSPITAL ENCOUNTER (OUTPATIENT)
Dept: CARDIOLOGY | Facility: HOSPITAL | Age: 65
Discharge: HOME | End: 2025-03-31
Attending: COLON & RECTAL SURGERY
Payer: COMMERCIAL

## 2025-03-31 ENCOUNTER — OFFICE VISIT (OUTPATIENT)
Dept: SURGERY | Facility: CLINIC | Age: 65
End: 2025-03-31
Payer: COMMERCIAL

## 2025-03-31 ENCOUNTER — APPOINTMENT (OUTPATIENT)
Dept: LAB | Facility: HOSPITAL | Age: 65
End: 2025-03-31
Attending: COLON & RECTAL SURGERY
Payer: COMMERCIAL

## 2025-03-31 VITALS
OXYGEN SATURATION: 97 % | HEIGHT: 71 IN | SYSTOLIC BLOOD PRESSURE: 123 MMHG | HEART RATE: 69 BPM | WEIGHT: 227 LBS | BODY MASS INDEX: 31.78 KG/M2 | TEMPERATURE: 97 F | DIASTOLIC BLOOD PRESSURE: 80 MMHG

## 2025-03-31 DIAGNOSIS — Z01.818 PREOP TESTING: ICD-10-CM

## 2025-03-31 DIAGNOSIS — K60.2 ANAL FISSURE: Primary | ICD-10-CM

## 2025-03-31 LAB
ALBUMIN SERPL-MCNC: 4.3 G/DL (ref 3.5–5.7)
ALP SERPL-CCNC: 64 IU/L (ref 34–125)
ALT SERPL-CCNC: 37 IU/L (ref 7–52)
ANION GAP SERPL CALC-SCNC: 7 MEQ/L (ref 3–15)
AST SERPL-CCNC: 27 IU/L (ref 13–39)
ATRIAL RATE: 60
BASOPHILS # BLD: 0.06 K/UL (ref 0.01–0.1)
BASOPHILS NFR BLD: 1.1 %
BILIRUB SERPL-MCNC: 0.6 MG/DL (ref 0.3–1.2)
BUN SERPL-MCNC: 31 MG/DL (ref 7–25)
CALCIUM SERPL-MCNC: 9 MG/DL (ref 8.6–10.3)
CHLORIDE SERPL-SCNC: 107 MEQ/L (ref 98–107)
CO2 SERPL-SCNC: 25 MEQ/L (ref 21–31)
CREAT SERPL-MCNC: 1.4 MG/DL (ref 0.7–1.3)
DIFFERENTIAL METHOD BLD: ABNORMAL
EGFRCR SERPLBLD CKD-EPI 2021: 55.4 ML/MIN/1.73M*2
EOSINOPHIL # BLD: 0.4 K/UL (ref 0.04–0.54)
EOSINOPHIL NFR BLD: 7.2 %
ERYTHROCYTE [DISTWIDTH] IN BLOOD BY AUTOMATED COUNT: 13 % (ref 11.6–14.4)
GLUCOSE SERPL-MCNC: 91 MG/DL (ref 70–99)
HCT VFR BLD AUTO: 37 % (ref 40.1–51)
HGB BLD-MCNC: 12.1 G/DL (ref 13.7–17.5)
IMM GRANULOCYTES # BLD AUTO: 0.01 K/UL (ref 0–0.08)
IMM GRANULOCYTES NFR BLD AUTO: 0.2 %
LYMPHOCYTES # BLD: 1.85 K/UL (ref 1.2–3.5)
LYMPHOCYTES NFR BLD: 33.1 %
MCH RBC QN AUTO: 28.7 PG (ref 28–33.2)
MCHC RBC AUTO-ENTMCNC: 32.7 G/DL (ref 32.2–36.5)
MCV RBC AUTO: 87.9 FL (ref 83–98)
MONOCYTES # BLD: 0.37 K/UL (ref 0.3–1)
MONOCYTES NFR BLD: 6.6 %
NEUTROPHILS # BLD: 2.9 K/UL (ref 1.7–7)
NEUTS SEG NFR BLD: 51.8 %
NRBC BLD-RTO: 0 %
P AXIS: 53
PLATELET # BLD AUTO: 151 K/UL (ref 150–350)
PMV BLD AUTO: 11 FL (ref 9.4–12.4)
POTASSIUM SERPL-SCNC: 4.1 MEQ/L (ref 3.5–5.1)
PR INTERVAL: 174
PROT SERPL-MCNC: 7.1 G/DL (ref 6–8.2)
QRS DURATION: 82
QT INTERVAL: 424
QTC CALCULATION(BAZETT): 424
R AXIS: 20
RBC # BLD AUTO: 4.21 M/UL (ref 4.5–5.8)
SODIUM SERPL-SCNC: 139 MEQ/L (ref 136–145)
T WAVE AXIS: 18
VENTRICULAR RATE: 60
WBC # BLD AUTO: 5.59 K/UL (ref 3.8–10.5)

## 2025-03-31 PROCEDURE — 36415 COLL VENOUS BLD VENIPUNCTURE: CPT

## 2025-03-31 PROCEDURE — 93005 ELECTROCARDIOGRAM TRACING: CPT

## 2025-03-31 PROCEDURE — 80053 COMPREHEN METABOLIC PANEL: CPT

## 2025-03-31 PROCEDURE — 99214 OFFICE O/P EST MOD 30 MIN: CPT | Performed by: COLON & RECTAL SURGERY

## 2025-03-31 PROCEDURE — 93010 ELECTROCARDIOGRAM REPORT: CPT | Performed by: INTERNAL MEDICINE

## 2025-03-31 PROCEDURE — 85025 COMPLETE CBC W/AUTO DIFF WBC: CPT

## 2025-03-31 PROCEDURE — 3008F BODY MASS INDEX DOCD: CPT | Performed by: COLON & RECTAL SURGERY

## 2025-03-31 RX ORDER — CELECOXIB 100 MG/1
200 CAPSULE ORAL ONCE
Status: CANCELLED | OUTPATIENT
Start: 2025-03-31 | End: 2025-03-31

## 2025-03-31 RX ORDER — DICLOFENAC SODIUM 30 MG/G
GEL TOPICAL
COMMUNITY
End: 2025-04-10 | Stop reason: ENTERED-IN-ERROR

## 2025-03-31 RX ORDER — CYCLOBENZAPRINE HCL 5 MG
TABLET ORAL
COMMUNITY
End: 2025-04-10 | Stop reason: ENTERED-IN-ERROR

## 2025-03-31 RX ORDER — GABAPENTIN 100 MG/1
600 CAPSULE ORAL ONCE
Status: CANCELLED | OUTPATIENT
Start: 2025-03-31 | End: 2025-03-31

## 2025-03-31 RX ORDER — METRONIDAZOLE 500 MG/100ML
500 INJECTION, SOLUTION INTRAVENOUS
Status: CANCELLED | OUTPATIENT
Start: 2025-03-31

## 2025-03-31 RX ORDER — ACETAMINOPHEN 325 MG/1
1000 TABLET ORAL ONCE
Status: CANCELLED | OUTPATIENT
Start: 2025-03-31 | End: 2025-03-31

## 2025-03-31 NOTE — PATIENT INSTRUCTIONS
FISSURE    The anal canal is a short tube surrounded by muscle at the end of your rectum. The rectum is the bottom section of your colon (large intestine). An anal fissure (also called fissure-in-ano) is a small rip or tear in the lining of the anal canal. Fissures are common, but are often confused with other anal conditions, such as hemorrhoids.       CAUSES OF ANAL FISSURE  Fissures are usually caused by trauma to the inner lining of the anus from a bowel movement or other stretching of the anal canal. This can be due to a hard, dry bowel movement or loose, frequent bowel movements. Patients with a tight anal sphincter muscle are more likely to develop anal fissures. Less common causes of fissures include inflammatory bowel disease, anal infections, or tumors.     SYMPTOMS  Anal fissures typically cause a sharp pain that starts with the passage of stool. This pain may last several minutes to a few hours. As a result, many patients may try not to have bowel movements to prevent pain.  Other symptoms include:  Bright red blood on the stool or toilet paper after a bowel movement  A small lump or skin tag on the skin near the anal fissure (more common when chronic)    NONSURGICAL TREATMENT  Your physician will discuss the benefits and side effects of treatments.  Treatment includes:  A high-fiber diet and over-the-counter fiber supplements (25-35 grams of fiber/day) to make stools soft, formed, and bulky.  Over-the-counter stool softeners to make stools easier to pass.  Drinking more water to help prevent hard stools and aid in healing.  Warm tub baths (sitz baths) for 10 to 20 minutes, a few times per day (especially after bowel movements to soothe the area and help relax anal sphincter muscles). This is thought to help the healing process.  Medications, such as lidocaine, that can be applied to the skin around the anus for pain relief.  Medications such as diltiazam, nifedipine, or nitroglycerin ointment to relax  the anal sphincter muscles which helps the healing process.  Narcotic pain medications are avoided because they can cause constipation which could make the situation worse.     SURGICAL TREATMENT  Although most anal fissures do not require surgery, chronic fissures are harder to treat and surgery may be the best option. The goal of surgery is to help the anal sphincter muscle relax which reduces pain and spasms, allowing the fissure to heal. Surgical options include Botulinum toxin (Botox®) injection into the anal sphincter or surgical division of an inner part of the anal sphincter (lateral internal sphincterotomy). Your colon and rectal surgeon will find the best treatment for you and discuss the risks of surgery. Both types of surgery are typically done as same-day outpatient procedures.     POST-TREATMENT PROGNOSIS  Most patients can return to work and go back to daily activities a few days after surgery. Complete healing after both medical and surgical treatments can take 6 to 10 weeks. Even when the pain and bleeding lessen, it is important to maintain good bowel habits and eat a high-fiber diet. Continued hard or loose bowel movements, scarring, or spasm of the internal anal muscle can delay healing.  Botox® injections are associated with healing of chronic anal fissures in 50% to 80% of patients.  Sphincterotomy is successful in more than 90% of patients. Although uncommon, this procedure may affect the patients ability to fully control gas or bowel movements.  Fissures often come back. A fully healed fissure can come back after a hard bowel movement or trauma. Medical problems such as inflammatory bowel disease (Crohns disease), infections, or anal tumors can cause symptoms similar to anal fissures. If a fissure does not improve with treatment, it is important to be evaluated for other possible conditions.     CAN ANAL FISSURES LEAD TO COLON CANCER?  Anal fissures do not increase the risk of colon cancer  nor cause it. However, more serious conditions can cause similar symptoms. Even when a fissure has healed completely, your colon and rectal surgeon may request other tests. A colonoscopy may be done to rule out other causes of rectal bleeding.    WHAT IS A COLON AND RECTAL SURGEON?  Colon and rectal surgeons are experts in the surgical and non-surgical treatment of diseases of the colon, rectum and anus. They have completed advanced surgical training in the treatment of these diseases, as well as full general surgical training. Board-certified colon and rectal surgeons complete residencies in general surgery and colon and rectal surgery, and pass intensive examinations conducted by the American Board of Surgery and the American Board of Colon and Rectal Surgery. They are well versed in the treatment of both benign and malignant diseases of the colon, rectum and anus and are able to perform routine screening examinations and surgically treat conditions, if indicated to do so.    DISCLAIMER  The American Society of Colon and Rectal Surgeons is dedicated to ensuring high-quality patient care by advancing the science, prevention and management of disorders and diseases of the colon, rectum and anus. These brochures are inclusive but not prescriptive. Their purpose is to provide information on diseases and processes, rather than dictate a specific form of treatment. They are intended for the use of all practitioners, health care workers and patients who desire information about the management of the conditions addressed. It should be recognized that these brochures should not be deemed inclusive of all proper methods of care or exclusive of methods of care reasonably directed to obtain the same results. The ultimate judgment regarding the propriety of any specific procedure must be made by the physician in light of all the circumstances presented by the individual patient.     © 2012 American Society of Colon & Rectal  Surgeons

## 2025-03-31 NOTE — LETTER
March 31, 2025     Rhett Alonzo MD  [address] [facility] [address]    Patient: Rose Ortez  YOB: 1960  Date of Visit: 3/31/2025      Dear Dr. Alonzo:    Thank you for referring Rose Ortez to me for evaluation. Below are my notes for this consultation.    If you have questions, please do not hesitate to call me. I look forward to following your patient along with you.         Sincerely,        Efraín Campo MD        CC: Efraín Molina MD  3/31/2025 10:34 AM  Sign when Signing Visit  Colorectal Surgery Follow-up      Consent obtained from patient and all parties present in the room? yes  I have obtained the consent of everyone present in the room to make an audio recording of this visit to assist me in documenting the encounter in the EMR.       Subjective      History of Present Illness  The patient is a 64-year-old male who was initially seen in 12/2024 for anal pain without bleeding. He had a throbbing sensation after bowel movements. He was tender in the anal canal and had significant sphincter spasm, and there was a scar to the right of the posterior midline, but it was not obvious that he had an actual anal fissure. There was high suspicion of a chronic anal fissure with his symptoms, and he was started on nifedipine. He was seen again on 02/10/2025, and his symptoms had improved at that time. He was experiencing less pain and less frequent discomfort. He still had symptoms, and at that time, a sentinel tag was noted to the right of the posterior midline, but there was difficulty seeing an active wound at the base of the tag, though there was high suspicion of a fissure. The plan was to continue conservative therapy for another 6 weeks. He returns in follow-up.    He reports a regression in his condition over the past month, with a recurrence of pain during defecation. He describes a sensation of blockage on the right side, necessitating strenuous effort  "to pass stool. The pain is characterized as throbbing and persists for several hours post-defecation. He has been managing the pain with Advil and hot baths, which provide some relief. He also confirms regular use of the prescribed ointment.    MEDICATIONS  Nifedipine, Advil    Medical History: History reviewed. No pertinent past medical history.    Surgical History: No past surgical history on file.    Allergies: Patient has no known allergies.    Current Medications:  Current Outpatient Medications   Medication Sig Dispense Refill    cyclobenzaprine (FLEXERIL) 5 mg tablet TAKE 1 TABLET BY MOUTH TWICE DAILY AS NEEDED FOR MUSCLE CRAMPS      diclofenac sodium 3 % gel       hydrochlorothiazide (HYDRODIURIL) 50 mg tablet       ibuprofen (MOTRIN) 200 mg tablet Take 200 mg by mouth every 6 hours as needed.      losartan (COZAAR) 50 mg tablet Take 50 mg by mouth daily.      NIFEdipine 0.2 % ointment Apply 3 times daily as directed 60 g 0    omeprazole 20 mg tablet Take 1 tablet by mouth daily.       No current facility-administered medications for this visit.       Objective    Physicial Exam  Visit Vitals  /80 (BP Location: Left upper arm, Patient Position: Sitting)   Pulse 69   Temp 36.1 °C (97 °F) (Temporal)   Ht 1.803 m (5' 11\")   Wt 103 kg (227 lb)   SpO2 97%   BMI 31.66 kg/m²       Physical Exam  Vitals reviewed.   Constitutional:       Appearance: Normal appearance. He is well-developed.   HENT:      Head: Normocephalic and atraumatic.   Eyes:      Pupils: Pupils are equal, round, and reactive to light.   Cardiovascular:      Rate and Rhythm: Normal rate and regular rhythm.      Heart sounds: Normal heart sounds. No murmur heard.  Pulmonary:      Effort: Pulmonary effort is normal. No respiratory distress.      Breath sounds: Normal breath sounds. No wheezing.   Abdominal:      General: There is no distension.      Palpations: Abdomen is soft. There is no mass.      Tenderness: There is no abdominal " tenderness.      Hernia: No hernia is present.   Genitourinary:     Comments: There is a prominent external tag to the right of the posterior midline.  He is tender in this area but is difficult to see the active wound but highly suspicious for a chronic anal fissure in that location.  Musculoskeletal:         General: No tenderness. Normal range of motion.      Cervical back: Normal range of motion and neck supple.   Lymphadenopathy:      Cervical: No cervical adenopathy.   Skin:     General: Skin is warm and dry.      Capillary Refill: Capillary refill takes less than 2 seconds.      Findings: No rash.   Neurological:      Mental Status: He is alert and oriented to person, place, and time.      Cranial Nerves: No cranial nerve deficit.   Psychiatric:         Mood and Affect: Mood normal.         Behavior: Behavior normal.           Results        Assessment    Assessment & Plan  1. Chronic anal fissure.  He reports worsening pain over the last month, describing it as a throbbing pain lasting a few hours after bowel movements. Despite using nifedipine ointment daily, his symptoms have not improved. A sphincterotomy is recommended to alleviate the pain by making a small incision on the side of the anus to cut the end of the internal sphincter muscle. This outpatient procedure aims to release the spasm and promote healing. Potential risks, including minor control problems such as incomplete control of gas or minor anal seepage, were discussed. Labs, an EKG, and antibiotics have been ordered in preparation for the procedure. He will need someone to drive him to and from the procedure.        Problem List Items Addressed This Visit          Digestive    Anal fissure - Primary    Relevant Orders    Case request operating room: ANAL SPHINCTEROTOMY (Completed)     Other Visit Diagnoses         Preop testing        Relevant Orders    CBC and differential    Comprehensive metabolic panel    Electrocardiogram, 12-lead                   Efraín Campo MD    Attestation

## 2025-03-31 NOTE — PROGRESS NOTES
Colorectal Surgery Follow-up      Consent obtained from patient and all parties present in the room? yes  I have obtained the consent of everyone present in the room to make an audio recording of this visit to assist me in documenting the encounter in the EMR.       Subjective       History of Present Illness  The patient is a 64-year-old male who was initially seen in 12/2024 for anal pain without bleeding. He had a throbbing sensation after bowel movements. He was tender in the anal canal and had significant sphincter spasm, and there was a scar to the right of the posterior midline, but it was not obvious that he had an actual anal fissure. There was high suspicion of a chronic anal fissure with his symptoms, and he was started on nifedipine. He was seen again on 02/10/2025, and his symptoms had improved at that time. He was experiencing less pain and less frequent discomfort. He still had symptoms, and at that time, a sentinel tag was noted to the right of the posterior midline, but there was difficulty seeing an active wound at the base of the tag, though there was high suspicion of a fissure. The plan was to continue conservative therapy for another 6 weeks. He returns in follow-up.    He reports a regression in his condition over the past month, with a recurrence of pain during defecation. He describes a sensation of blockage on the right side, necessitating strenuous effort to pass stool. The pain is characterized as throbbing and persists for several hours post-defecation. He has been managing the pain with Advil and hot baths, which provide some relief. He also confirms regular use of the prescribed ointment.    MEDICATIONS  Nifedipine, Advil    Medical History: History reviewed. No pertinent past medical history.    Surgical History: No past surgical history on file.    Allergies: Patient has no known allergies.    Current Medications:  Current Outpatient Medications   Medication Sig Dispense Refill     "cyclobenzaprine (FLEXERIL) 5 mg tablet TAKE 1 TABLET BY MOUTH TWICE DAILY AS NEEDED FOR MUSCLE CRAMPS      diclofenac sodium 3 % gel       hydrochlorothiazide (HYDRODIURIL) 50 mg tablet       ibuprofen (MOTRIN) 200 mg tablet Take 200 mg by mouth every 6 hours as needed.      losartan (COZAAR) 50 mg tablet Take 50 mg by mouth daily.      NIFEdipine 0.2 % ointment Apply 3 times daily as directed 60 g 0    omeprazole 20 mg tablet Take 1 tablet by mouth daily.       No current facility-administered medications for this visit.       Objective     Physicial Exam  Visit Vitals  /80 (BP Location: Left upper arm, Patient Position: Sitting)   Pulse 69   Temp 36.1 °C (97 °F) (Temporal)   Ht 1.803 m (5' 11\")   Wt 103 kg (227 lb)   SpO2 97%   BMI 31.66 kg/m²       Physical Exam  Vitals reviewed.   Constitutional:       Appearance: Normal appearance. He is well-developed.   HENT:      Head: Normocephalic and atraumatic.   Eyes:      Pupils: Pupils are equal, round, and reactive to light.   Cardiovascular:      Rate and Rhythm: Normal rate and regular rhythm.      Heart sounds: Normal heart sounds. No murmur heard.  Pulmonary:      Effort: Pulmonary effort is normal. No respiratory distress.      Breath sounds: Normal breath sounds. No wheezing.   Abdominal:      General: There is no distension.      Palpations: Abdomen is soft. There is no mass.      Tenderness: There is no abdominal tenderness.      Hernia: No hernia is present.   Genitourinary:     Comments: There is a prominent external tag to the right of the posterior midline.  He is tender in this area but is difficult to see the active wound but highly suspicious for a chronic anal fissure in that location.  Musculoskeletal:         General: No tenderness. Normal range of motion.      Cervical back: Normal range of motion and neck supple.   Lymphadenopathy:      Cervical: No cervical adenopathy.   Skin:     General: Skin is warm and dry.      Capillary Refill: " Capillary refill takes less than 2 seconds.      Findings: No rash.   Neurological:      Mental Status: He is alert and oriented to person, place, and time.      Cranial Nerves: No cranial nerve deficit.   Psychiatric:         Mood and Affect: Mood normal.         Behavior: Behavior normal.           Results        Assessment     Assessment & Plan  1. Chronic anal fissure.  He reports worsening pain over the last month, describing it as a throbbing pain lasting a few hours after bowel movements. Despite using nifedipine ointment daily, his symptoms have not improved. A sphincterotomy is recommended to alleviate the pain by making a small incision on the side of the anus to cut the end of the internal sphincter muscle. This outpatient procedure aims to release the spasm and promote healing. Potential risks, including minor control problems such as incomplete control of gas or minor anal seepage, were discussed. Labs, an EKG, and antibiotics have been ordered in preparation for the procedure. He will need someone to drive him to and from the procedure.        Problem List Items Addressed This Visit          Digestive    Anal fissure - Primary    Relevant Orders    Case request operating room: ANAL SPHINCTEROTOMY (Completed)     Other Visit Diagnoses         Preop testing        Relevant Orders    CBC and differential    Comprehensive metabolic panel    Electrocardiogram, 12-lead                  Efraín Campo MD    Attestation

## 2025-04-10 ENCOUNTER — PRE-ADMISSION TESTING (OUTPATIENT)
Dept: PREADMISSION TESTING | Age: 65
End: 2025-04-10
Payer: COMMERCIAL

## 2025-04-10 VITALS — WEIGHT: 225 LBS | HEIGHT: 71 IN | BODY MASS INDEX: 31.5 KG/M2

## 2025-04-10 RX ORDER — ACETAMINOPHEN 325 MG/1
650 TABLET ORAL EVERY 6 HOURS PRN
COMMUNITY

## 2025-04-10 NOTE — PRE-PROCEDURE INSTRUCTIONS
[address] [address]    1. We will call you between 3 pm and 7 pm on 4/24 to determine that arrival time for your procedure. If you do not hear by 6PM. Please call [phone] for arrival time.     2. Please report to Main Entrance near [address], walk into main lobby and report to the admission desk on the first floor on the day of your procedure.    3. Please follow these fasting guidelines:     No solid food EIGHT HOURS prior to arrival time on day of surgery.  6 ounces of clear liquids, meaning water or PLAIN black coffee WITHOUT any milk, cream, sugar, or sweetener are permitted up to TWO HOURS prior to arrival at the hospital.    4. Please take ONLY the following medications with a sip of water on the morning of your procedure: take omeprazole. May take tylenol  if needed.    5. Other Instructions:   Stop NSAID  medications ( non steroidal antiinflammatories - advil, motrin ,aleve, aspirin, ibuprofen, meloxicam, naprosyn, etc), as well as vitamins and supplements  one week prior to surgery - unless directed differently by surgeon.   You may brush your teeth the morning of the procedure. Rinse and spit, do not swallow.    Bring a list of your medications with dosages.    Use surgical wash as directed. - use dial gold soap.    6. If you develop a cold, cough, fever, rash, or other symptom prior to the day of the procedure, please report it to your physician immediately.    7. If you need to cancel the procedure for any reason, please contact your physician.    8. Make arrangements to have safe transportation home accompanied by a responsible adult. If you have not arranged safe transportation home, your surgery will be cancelled. Safe transportation may include private vehicle, ride-share service, taxi and public transportation when accompanied by a responsible adult who will assist you home. A responsible adult is someone known to you and does not include  the taxi, ride-share or public transit drive transporting you.    9.  If it is medically necessary for you to have a longer stay, you will be informed as soon as the decision is made.    10. Only bring essential items to the hospital.  Do not wear or bring anything of value to the hospital including jewelry of any kind, money, or wallet. Do not wear make-up or contact lenses.  DO NOT BRING MEDICATIONS FROM HOME unless instructed to do so. DO bring your hearing aids, glasses, and a case    11. No lotion, creams, powders, or oils on skin the morning of procedure     12. Dress in comfortable clothes.    13.  If instructed, please bring a copy of your Advanced Directive (Living Will/Durable Power of ) on the day of your procedure.     14. Ensuring your safety at all times is a very important part of our [facility] Culture of Safety. After having surgery and sedation, you are at risk for falling and balance issues. Although you may feel awake, the effects of the medication can last up to 24 hours after anesthesia. If you need to use the bathroom during your recovery period, nursing staff will escort you there and stay with you to ensure your safety.    15. Refrain from drinking alcohol and smoking cigarettes for 24 hours prior to surgery.    16. Shower with antibacterial soap (Dial) the night before and morning of your procedure.  If your procedure indicates the need for CHG antiseptic wash (Bactoshield or Hibiclens), please use this instead and follow instructions as discussed at the time of your Pre-Admission Testing phone interview or visit.    Above instructions reviewed with patient and patient acknowledges understanding.    Form explained by: Kathrine Nieto RN

## 2025-04-22 ENCOUNTER — TELEPHONE (OUTPATIENT)
Dept: SURGERY | Facility: CLINIC | Age: 65
End: 2025-04-22
Payer: COMMERCIAL

## 2025-04-30 ENCOUNTER — ANESTHESIA EVENT (OUTPATIENT)
Dept: OPERATING ROOM | Facility: HOSPITAL | Age: 65
Setting detail: HOSPITAL OUTPATIENT SURGERY
End: 2025-04-30
Payer: COMMERCIAL

## 2025-04-30 ENCOUNTER — HOSPITAL ENCOUNTER (OUTPATIENT)
Facility: HOSPITAL | Age: 65
Setting detail: HOSPITAL OUTPATIENT SURGERY
Discharge: HOME | End: 2025-04-30
Attending: COLON & RECTAL SURGERY | Admitting: COLON & RECTAL SURGERY
Payer: COMMERCIAL

## 2025-04-30 VITALS
TEMPERATURE: 97.5 F | DIASTOLIC BLOOD PRESSURE: 87 MMHG | RESPIRATION RATE: 16 BRPM | BODY MASS INDEX: 31.5 KG/M2 | OXYGEN SATURATION: 98 % | WEIGHT: 225 LBS | HEIGHT: 71 IN | HEART RATE: 69 BPM | SYSTOLIC BLOOD PRESSURE: 134 MMHG

## 2025-04-30 DIAGNOSIS — K60.2 ANAL FISSURE: ICD-10-CM

## 2025-04-30 PROCEDURE — 63600000 HC DRUGS/DETAIL CODE: Performed by: COLON & RECTAL SURGERY

## 2025-04-30 PROCEDURE — 71000012 HC PACU PHASE 2 EA ADDL MIN: Performed by: COLON & RECTAL SURGERY

## 2025-04-30 PROCEDURE — 63600000 HC DRUGS/DETAIL CODE: Mod: JZ | Performed by: STUDENT IN AN ORGANIZED HEALTH CARE EDUCATION/TRAINING PROGRAM

## 2025-04-30 PROCEDURE — 37000002 HC ANESTHESIA MAC: Performed by: COLON & RECTAL SURGERY

## 2025-04-30 PROCEDURE — 36000012 HC OR LEVEL 2 EA ADDL MIN: Performed by: COLON & RECTAL SURGERY

## 2025-04-30 PROCEDURE — 25800000 HC PHARMACY IV SOLUTIONS: Performed by: STUDENT IN AN ORGANIZED HEALTH CARE EDUCATION/TRAINING PROGRAM

## 2025-04-30 PROCEDURE — 36000002 HC OR LEVEL 2 INITIAL 30MIN: Performed by: COLON & RECTAL SURGERY

## 2025-04-30 PROCEDURE — 0D8R0ZZ DIVISION OF ANAL SPHINCTER, OPEN APPROACH: ICD-10-PCS | Performed by: COLON & RECTAL SURGERY

## 2025-04-30 PROCEDURE — 63700000 HC SELF-ADMINISTRABLE DRUG: Performed by: STUDENT IN AN ORGANIZED HEALTH CARE EDUCATION/TRAINING PROGRAM

## 2025-04-30 PROCEDURE — 25000000 HC PHARMACY GENERAL: Performed by: STUDENT IN AN ORGANIZED HEALTH CARE EDUCATION/TRAINING PROGRAM

## 2025-04-30 PROCEDURE — 88304 TISSUE EXAM BY PATHOLOGIST: CPT | Performed by: COLON & RECTAL SURGERY

## 2025-04-30 PROCEDURE — 27200000 HC STERILE SUPPLY: Performed by: COLON & RECTAL SURGERY

## 2025-04-30 PROCEDURE — 63600000 HC DRUGS/DETAIL CODE: Mod: JZ | Performed by: COLON & RECTAL SURGERY

## 2025-04-30 PROCEDURE — 46080 SPHNCTROTMY ANAL DIV SPHNCTR: CPT | Performed by: COLON & RECTAL SURGERY

## 2025-04-30 PROCEDURE — 25000000 HC PHARMACY GENERAL: Performed by: COLON & RECTAL SURGERY

## 2025-04-30 PROCEDURE — 71000011 HC PACU PHASE 1 EA ADDL MIN: Performed by: COLON & RECTAL SURGERY

## 2025-04-30 PROCEDURE — 63700000 HC SELF-ADMINISTRABLE DRUG: Performed by: COLON & RECTAL SURGERY

## 2025-04-30 PROCEDURE — 63700000 HC SELF-ADMINISTRABLE DRUG

## 2025-04-30 PROCEDURE — 71000002 HC PACU PHASE 2 INITIAL 30MIN: Performed by: COLON & RECTAL SURGERY

## 2025-04-30 PROCEDURE — 71000001 HC PACU PHASE 1 INITIAL 30MIN: Performed by: COLON & RECTAL SURGERY

## 2025-04-30 RX ORDER — DEXTROSE 50 % IN WATER (D50W) INTRAVENOUS SYRINGE
25 AS NEEDED
Status: DISCONTINUED | OUTPATIENT
Start: 2025-04-30 | End: 2025-04-30 | Stop reason: HOSPADM

## 2025-04-30 RX ORDER — ACETAMINOPHEN 500 MG
1000 TABLET ORAL ONCE
Status: COMPLETED | OUTPATIENT
Start: 2025-04-30 | End: 2025-04-30

## 2025-04-30 RX ORDER — POLYETHYLENE GLYCOL 3350 17 G/17G
17 POWDER, FOR SOLUTION ORAL DAILY
Qty: 510 G | Refills: 5 | Status: SHIPPED | OUTPATIENT
Start: 2025-04-30 | End: 2025-04-30

## 2025-04-30 RX ORDER — DEXTROSE 40 %
15-30 GEL (GRAM) ORAL AS NEEDED
Status: DISCONTINUED | OUTPATIENT
Start: 2025-04-30 | End: 2025-04-30 | Stop reason: HOSPADM

## 2025-04-30 RX ORDER — POLYETHYLENE GLYCOL 3350 17 G/17G
17 POWDER, FOR SOLUTION ORAL NIGHTLY
Qty: 510 G | Refills: 5 | Status: SHIPPED | OUTPATIENT
Start: 2025-04-30 | End: 2025-05-30

## 2025-04-30 RX ORDER — BUPIVACAINE HYDROCHLORIDE 5 MG/ML
INJECTION, SOLUTION EPIDURAL; INTRACAUDAL; PERINEURAL
Status: DISCONTINUED | OUTPATIENT
Start: 2025-04-30 | End: 2025-04-30 | Stop reason: HOSPADM

## 2025-04-30 RX ORDER — MIDAZOLAM HYDROCHLORIDE 2 MG/2ML
INJECTION, SOLUTION INTRAMUSCULAR; INTRAVENOUS AS NEEDED
Status: DISCONTINUED | OUTPATIENT
Start: 2025-04-30 | End: 2025-04-30 | Stop reason: SURG

## 2025-04-30 RX ORDER — ACETAMINOPHEN 325 MG/1
975 TABLET ORAL EVERY 6 HOURS
Status: DISCONTINUED | OUTPATIENT
Start: 2025-04-30 | End: 2025-04-30 | Stop reason: HOSPADM

## 2025-04-30 RX ORDER — METRONIDAZOLE 500 MG/100ML
500 INJECTION, SOLUTION INTRAVENOUS
Status: COMPLETED | OUTPATIENT
Start: 2025-04-30 | End: 2025-04-30

## 2025-04-30 RX ORDER — CELECOXIB 200 MG/1
200 CAPSULE ORAL ONCE
Status: COMPLETED | OUTPATIENT
Start: 2025-04-30 | End: 2025-04-30

## 2025-04-30 RX ORDER — FENTANYL CITRATE 50 UG/ML
INJECTION, SOLUTION INTRAMUSCULAR; INTRAVENOUS AS NEEDED
Status: DISCONTINUED | OUTPATIENT
Start: 2025-04-30 | End: 2025-04-30 | Stop reason: SURG

## 2025-04-30 RX ORDER — ONDANSETRON HYDROCHLORIDE 2 MG/ML
4 INJECTION, SOLUTION INTRAVENOUS
Status: DISCONTINUED | OUTPATIENT
Start: 2025-04-30 | End: 2025-04-30 | Stop reason: HOSPADM

## 2025-04-30 RX ORDER — ONDANSETRON HYDROCHLORIDE 2 MG/ML
INJECTION, SOLUTION INTRAVENOUS AS NEEDED
Status: DISCONTINUED | OUTPATIENT
Start: 2025-04-30 | End: 2025-04-30 | Stop reason: SURG

## 2025-04-30 RX ORDER — IBUPROFEN 200 MG
16-32 TABLET ORAL AS NEEDED
Status: DISCONTINUED | OUTPATIENT
Start: 2025-04-30 | End: 2025-04-30 | Stop reason: HOSPADM

## 2025-04-30 RX ORDER — PROPOFOL 10 MG/ML
INJECTION, EMULSION INTRAVENOUS CONTINUOUS PRN
Status: DISCONTINUED | OUTPATIENT
Start: 2025-04-30 | End: 2025-04-30 | Stop reason: SURG

## 2025-04-30 RX ORDER — FENTANYL CITRATE 50 UG/ML
50 INJECTION, SOLUTION INTRAMUSCULAR; INTRAVENOUS EVERY 5 MIN PRN
Status: DISCONTINUED | OUTPATIENT
Start: 2025-04-30 | End: 2025-04-30 | Stop reason: HOSPADM

## 2025-04-30 RX ORDER — SODIUM CHLORIDE 9 MG/ML
INJECTION, SOLUTION INTRAVENOUS CONTINUOUS PRN
Status: DISCONTINUED | OUTPATIENT
Start: 2025-04-30 | End: 2025-04-30 | Stop reason: SURG

## 2025-04-30 RX ORDER — PSYLLIUM HUSK 3.4 G/12G
1 POWDER ORAL 2 TIMES DAILY
Qty: 60 PACKET | Refills: 0 | Status: SHIPPED | OUTPATIENT
Start: 2025-04-30 | End: 2025-04-30

## 2025-04-30 RX ORDER — GABAPENTIN 300 MG/1
600 CAPSULE ORAL ONCE
Status: COMPLETED | OUTPATIENT
Start: 2025-04-30 | End: 2025-04-30

## 2025-04-30 RX ORDER — LIDOCAINE HYDROCHLORIDE AND EPINEPHRINE 10; 10 UG/ML; MG/ML
INJECTION, SOLUTION INFILTRATION; PERINEURAL
Status: DISCONTINUED | OUTPATIENT
Start: 2025-04-30 | End: 2025-04-30 | Stop reason: HOSPADM

## 2025-04-30 RX ORDER — OXYCODONE HYDROCHLORIDE 5 MG/1
5 TABLET ORAL ONCE
Refills: 0 | Status: COMPLETED | OUTPATIENT
Start: 2025-04-30 | End: 2025-04-30

## 2025-04-30 RX ORDER — LIDOCAINE HYDROCHLORIDE 10 MG/ML
INJECTION, SOLUTION EPIDURAL; INFILTRATION; INTRACAUDAL; PERINEURAL AS NEEDED
Status: DISCONTINUED | OUTPATIENT
Start: 2025-04-30 | End: 2025-04-30 | Stop reason: SURG

## 2025-04-30 RX ORDER — PSYLLIUM HUSK 3.4 G/12G
1 POWDER ORAL NIGHTLY
Qty: 30 PACKET | Refills: 0 | Status: SHIPPED | OUTPATIENT
Start: 2025-04-30 | End: 2025-05-30

## 2025-04-30 RX ADMIN — SODIUM CHLORIDE: 9 INJECTION, SOLUTION INTRAVENOUS at 07:28

## 2025-04-30 RX ADMIN — GABAPENTIN 600 MG: 300 CAPSULE ORAL at 06:40

## 2025-04-30 RX ADMIN — OXYCODONE HYDROCHLORIDE 5 MG: 5 TABLET ORAL at 11:25

## 2025-04-30 RX ADMIN — FENTANYL CITRATE 50 MCG: 50 INJECTION, SOLUTION INTRAMUSCULAR; INTRAVENOUS at 07:47

## 2025-04-30 RX ADMIN — ONDANSETRON HYDROCHLORIDE 4 MG: 2 SOLUTION INTRAMUSCULAR; INTRAVENOUS at 07:40

## 2025-04-30 RX ADMIN — CELECOXIB 200 MG: 200 CAPSULE ORAL at 06:40

## 2025-04-30 RX ADMIN — FENTANYL CITRATE 50 MCG: 50 INJECTION, SOLUTION INTRAMUSCULAR; INTRAVENOUS at 07:55

## 2025-04-30 RX ADMIN — LIDOCAINE HYDROCHLORIDE 10 ML: 10 INJECTION, SOLUTION EPIDURAL; INFILTRATION; INTRACAUDAL; PERINEURAL at 07:32

## 2025-04-30 RX ADMIN — MIDAZOLAM HYDROCHLORIDE 2 MG: 1 INJECTION, SOLUTION INTRAMUSCULAR; INTRAVENOUS at 07:28

## 2025-04-30 RX ADMIN — METRONIDAZOLE 500 MG: 500 INJECTION, SOLUTION INTRAVENOUS at 07:33

## 2025-04-30 RX ADMIN — ACETAMINOPHEN 1000 MG: 325 TABLET ORAL at 06:40

## 2025-04-30 RX ADMIN — PROPOFOL 100 MCG/KG/MIN: 10 INJECTION, EMULSION INTRAVENOUS at 07:32

## 2025-04-30 RX ADMIN — CEFAZOLIN 2 G: 2 INJECTION, POWDER, FOR SOLUTION INTRAMUSCULAR; INTRAVENOUS at 07:33

## 2025-04-30 RX ADMIN — ACETAMINOPHEN 975 MG: 325 TABLET ORAL at 14:08

## 2025-05-01 LAB
CASE RPRT: NORMAL
CLINICAL INFO: NORMAL
PATH REPORT.FINAL DX SPEC: NORMAL
PATH REPORT.GROSS SPEC: NORMAL

## 2025-05-05 ENCOUNTER — OFFICE VISIT (OUTPATIENT)
Dept: SURGERY | Facility: CLINIC | Age: 65
End: 2025-05-05
Payer: COMMERCIAL

## 2025-05-05 ENCOUNTER — TELEPHONE (OUTPATIENT)
Dept: SURGERY | Facility: CLINIC | Age: 65
End: 2025-05-05
Payer: COMMERCIAL

## 2025-05-05 VITALS
TEMPERATURE: 97.4 F | DIASTOLIC BLOOD PRESSURE: 75 MMHG | SYSTOLIC BLOOD PRESSURE: 106 MMHG | HEART RATE: 76 BPM | BODY MASS INDEX: 29.82 KG/M2 | WEIGHT: 213 LBS | OXYGEN SATURATION: 99 % | HEIGHT: 71 IN

## 2025-05-05 DIAGNOSIS — K60.2 ANAL FISSURE: Primary | ICD-10-CM

## 2025-05-05 PROCEDURE — 99999 PR OFFICE/OUTPT VISIT,PROCEDURE ONLY: CPT | Performed by: COLON & RECTAL SURGERY

## 2025-05-05 PROCEDURE — 46050 I&D PERIANAL ABSCESS SUPFC: CPT | Mod: 78 | Performed by: COLON & RECTAL SURGERY

## 2025-05-05 RX ORDER — LIDOCAINE HYDROCHLORIDE AND EPINEPHRINE 10; 10 UG/ML; MG/ML
5 INJECTION, SOLUTION INFILTRATION; PERINEURAL ONCE
Status: COMPLETED | OUTPATIENT
Start: 2025-05-05 | End: 2025-05-05

## 2025-05-05 RX ORDER — TRAMADOL HYDROCHLORIDE 50 MG/1
50 TABLET ORAL EVERY 4 HOURS PRN
Qty: 10 TABLET | Refills: 0 | Status: SHIPPED | OUTPATIENT
Start: 2025-05-05 | End: 2025-05-10

## 2025-05-05 RX ORDER — METRONIDAZOLE 500 MG/1
1000 TABLET ORAL 3 TIMES DAILY
Qty: 30 TABLET | Refills: 0 | Status: SHIPPED | OUTPATIENT
Start: 2025-05-05 | End: 2025-05-10

## 2025-05-05 RX ORDER — CIPROFLOXACIN 500 MG/1
500 TABLET ORAL 2 TIMES DAILY
Qty: 10 TABLET | Refills: 0 | Status: SHIPPED | OUTPATIENT
Start: 2025-05-05 | End: 2025-05-10

## 2025-05-05 RX ORDER — LIDOCAINE HYDROCHLORIDE AND EPINEPHRINE 10; 10 UG/ML; MG/ML
5 INJECTION, SOLUTION INFILTRATION; PERINEURAL ONCE
Status: DISCONTINUED | OUTPATIENT
Start: 2025-05-05 | End: 2025-05-05

## 2025-05-05 RX ADMIN — LIDOCAINE HYDROCHLORIDE AND EPINEPHRINE 5 ML: 10; 10 INJECTION, SOLUTION INFILTRATION; PERINEURAL at 16:55

## 2025-05-06 ENCOUNTER — TELEPHONE (OUTPATIENT)
Dept: SURGERY | Facility: CLINIC | Age: 65
End: 2025-05-06
Payer: COMMERCIAL

## 2025-05-15 ENCOUNTER — OFFICE VISIT (OUTPATIENT)
Dept: SURGERY | Facility: CLINIC | Age: 65
End: 2025-05-15
Payer: COMMERCIAL

## 2025-05-15 VITALS
SYSTOLIC BLOOD PRESSURE: 104 MMHG | OXYGEN SATURATION: 99 % | HEIGHT: 71 IN | WEIGHT: 219.4 LBS | HEART RATE: 82 BPM | BODY MASS INDEX: 30.72 KG/M2 | TEMPERATURE: 97.3 F | DIASTOLIC BLOOD PRESSURE: 74 MMHG

## 2025-05-15 DIAGNOSIS — R39.11 URINARY HESITANCY: Primary | ICD-10-CM

## 2025-05-15 DIAGNOSIS — K60.2 ANAL FISSURE: ICD-10-CM

## 2025-05-15 PROCEDURE — 99024 POSTOP FOLLOW-UP VISIT: CPT | Performed by: COLON & RECTAL SURGERY

## 2025-05-15 RX ORDER — TAMSULOSIN HYDROCHLORIDE 0.4 MG/1
0.4 CAPSULE ORAL DAILY
Status: DISCONTINUED | OUTPATIENT
Start: 2025-05-15 | End: 2025-05-15

## 2025-05-15 RX ORDER — TAMSULOSIN HYDROCHLORIDE 0.4 MG/1
0.4 CAPSULE ORAL NIGHTLY
Qty: 30 CAPSULE | Refills: 0 | Status: SHIPPED | OUTPATIENT
Start: 2025-05-15 | End: 2025-06-14

## 2025-06-19 ENCOUNTER — OFFICE VISIT (OUTPATIENT)
Dept: SURGERY | Facility: CLINIC | Age: 65
End: 2025-06-19
Payer: COMMERCIAL

## 2025-06-19 VITALS
SYSTOLIC BLOOD PRESSURE: 116 MMHG | WEIGHT: 224 LBS | HEART RATE: 77 BPM | OXYGEN SATURATION: 98 % | BODY MASS INDEX: 31.36 KG/M2 | DIASTOLIC BLOOD PRESSURE: 76 MMHG | HEIGHT: 71 IN | TEMPERATURE: 97.3 F

## 2025-06-19 DIAGNOSIS — K60.2 ANAL FISSURE: ICD-10-CM

## 2025-06-19 NOTE — PROGRESS NOTES
Colorectal Surgery Follow-up      Consent obtained from patient and all parties present in the room? yes  I have obtained the consent of everyone present in the room to make an audio recording of this visit to assist me in documenting the encounter in the EMR.       Subjective     He had an anal sphincterotomy for chronic anal fissure to the right of the posterior midline on 4/30/2025.  He developed severe abdominal pain about 4 days after surgery and was seen in the office on 5/5 where he had tenderness and inflammatory changes at the sphincterotomy site on the right side.  This area was anesthetized and then I&D was performed with some mild purulent discharge.  The patient was seen back on 5/15 and still had some discomfort and some anal bleeding.  The fissure itself seem to be healed completely and there was raw tissue at the sphincterotomy site but it seemed to be healing.    The patient was having urinary hesitancy and discomfort.  He was started on Flomax.  History of Present Illness  The patient presents for evaluation of an anal fissure.    He reports persistent pain during bowel movements, accompanied by occasional bleeding, both on wiping and in the toilet. The severity of his symptoms fluctuates, with periods of lesser and greater intensity. He has exhausted his supply of nifedipine cream. He notes a marked improvement in his condition compared to the preoperative state, with only minimal discomfort remaining.    MEDICATIONS  Nifedipine    Medical History:   Past Medical History:   Diagnosis Date    Anal fissure     GERD (gastroesophageal reflux disease)     Hypertension        Surgical History:   Past Surgical History   Procedure Laterality Date    Anal fistulotomy      ANAL SPHINCTEROTOMY N/A 4/30/2025    Performed by Efraín Campo MD at [facility] OR    Cervical spine surgery      Colonoscopy         Allergies: Patient has no known allergies.    Current Medications:  Current Outpatient Medications  "  Medication Sig Dispense Refill    acetaminophen (TYLENOL) 325 mg tablet Take 650 mg by mouth every 6 (six) hours as needed.      hydrochlorothiazide (HYDRODIURIL) 50 mg tablet Take 50 mg by mouth daily.      losartan (COZAAR) 50 mg tablet Take 50 mg by mouth daily.      NIFEdipine 0.2 % ointment Apply 3 times daily as directed 60 g 0    omeprazole 20 mg tablet Take 1 tablet by mouth daily.      polyethylene glycol (MIRALAX) 17 gram/dose powder Take 17 g by mouth nightly. 510 g 5    psyllium (METAMUCIL, WITH SUGAR,) 3.4 gram packet Take 1 packet by mouth nightly. 30 packet 0    tamsulosin (FLOMAX) 0.4 mg capsule Take 1 capsule (0.4 mg total) by mouth nightly. 30 capsule 0    ibuprofen (MOTRIN) 200 mg tablet Take 200 mg by mouth every 6 hours as needed. (Patient not taking: Reported on 5/5/2025)       No current facility-administered medications for this visit.       Objective     Review of Systems:  All other systems reviewed and are negative.    Physicial Exam  Visit Vitals  /76 (BP Location: Left upper arm, Patient Position: Sitting)   Pulse 77   Temp 36.3 °C (97.3 °F) (Temporal)   Ht 1.803 m (5' 11\")   Wt 102 kg (224 lb)   SpO2 98%   BMI 31.24 kg/m²       Physical Exam    Physical Exam  A small wound is present on the right side at the sphincterotomy site in the anus area. T this was seen better with the anoscopy which was very uncomfortable for him.  He fissure itself is fully healed.  The granulation tissue on the right side was treated with silver nitrate which he seemed to tolerate reasonably well.      Results        Assessment     Assessment & Plan  1. Anal fissure.  The anal fissure has fully healed, but there is a persistent small wound on the right side of the sphincterotomy site. The area was cauterized with silver nitrate to promote healing. A prescription for nifedipine cream has been renewed, to be applied 3 times daily to the anal canal. This treatment aims to improve blood flow and " facilitate healing.    Follow-up  The patient will follow up in 1 month.    PROCEDURE  The patient underwent a sphincterotomy for an anal fissure. The sphincterotomy site was subsequently opened and cleaned due to an infection. Today, the area was cauterized with silver nitrate.        Problem List Items Addressed This Visit          Digestive    Anal fissure    Relevant Medications    NIFEdipine 0.2 % ointment             Efraín Campo MD

## 2025-06-20 ENCOUNTER — TELEPHONE (OUTPATIENT)
Dept: SURGERY | Facility: CLINIC | Age: 65
End: 2025-06-20
Payer: COMMERCIAL

## 2025-06-20 DIAGNOSIS — K60.2 ANAL FISSURE: ICD-10-CM

## 2025-06-20 NOTE — TELEPHONE ENCOUNTER
Patient called in and stated that he would like to speak with someone to change his pharmacy for a medication that was sent over from Dr. Campo yesterday   NIFEdipine 0.2 % ointment   [phone]

## 2025-08-04 ENCOUNTER — OFFICE VISIT (OUTPATIENT)
Dept: SURGERY | Facility: CLINIC | Age: 65
End: 2025-08-04
Payer: COMMERCIAL

## 2025-08-04 VITALS
HEIGHT: 71 IN | SYSTOLIC BLOOD PRESSURE: 106 MMHG | BODY MASS INDEX: 30.8 KG/M2 | WEIGHT: 220 LBS | DIASTOLIC BLOOD PRESSURE: 69 MMHG | HEART RATE: 76 BPM

## 2025-08-04 DIAGNOSIS — K60.2 ANAL FISSURE: Primary | ICD-10-CM

## 2025-08-04 PROCEDURE — 3008F BODY MASS INDEX DOCD: CPT | Performed by: COLON & RECTAL SURGERY

## 2025-08-04 PROCEDURE — 46600 DIAGNOSTIC ANOSCOPY SPX: CPT | Performed by: COLON & RECTAL SURGERY

## 2025-08-04 PROCEDURE — 99213 OFFICE O/P EST LOW 20 MIN: CPT | Mod: 25 | Performed by: COLON & RECTAL SURGERY

## 2025-08-04 RX ORDER — HYDROCORTISONE ACETATE 25 MG/1
25 SUPPOSITORY RECTAL 2 TIMES DAILY
Qty: 24 SUPPOSITORY | Refills: 0 | Status: SHIPPED | OUTPATIENT
Start: 2025-08-04 | End: 2025-08-14

## 2025-08-04 NOTE — PROGRESS NOTES
Colorectal Surgery Follow-up      Consent obtained from patient and all parties present in the room? yes  I have obtained the consent of everyone present in the room to make an audio recording of this visit to assist me in documenting the encounter in the EMR.       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 He experienced severe pain after surgery and was found to have an infection at the sphincterotomy site, which was treated with incision and drainage (I & D) in the office on 05/05/2025. He was last seen on 06/19/2025, at which time the anal fissure was fully healed, but there was a persistent small wound at the sphincterotomy site. This wound was treated with silver nitrate, and he was advised to restart his nifedipine cream due to ongoing fissure-type pain during bowel movements.    He reports that his condition has not improved as expected, with persistent discomfort during bowel movements. The severity of his symptoms has decreased compared to the initial post-surgery period, but they remain consistent and occur with each bowel movement, lasting for several hours. He also experiences incomplete bowel movements, necessitating multiple visits to the bathroom, which further irritates the area and leads to bleeding. He continues to use nifedipine ointment twice daily, sometimes three times if not working. He has not used hydrocortisone suppositories in the past. Physical activities do not exacerbate his discomfort. He manages his discomfort with sitz baths and occasional Advil.    MEDICATIONS  Current: nifedipine cream, Advil    Medical History:   Past Medical History:   Diagnosis Date    Anal fissure     GERD (gastroesophageal reflux disease)     Hypertension        Surgical History:   Past Surgical History   Procedure Laterality Date    Anal fistulotomy      ANAL  "SPHINCTEROTOMY N/A 4/30/2025    Performed by Efraín Campo MD at [facility] [place]    Cervical spine surgery      Colonoscopy         Allergies: Patient has no known allergies.    Current Medications:  Current Outpatient Medications   Medication Sig Dispense Refill    acetaminophen (TYLENOL) 325 mg tablet Take 650 mg by mouth every 6 (six) hours as needed.      hydrochlorothiazide (HYDRODIURIL) 50 mg tablet Take 50 mg by mouth daily.      hydrocortisone (ANUSOL-HC) 25 mg suppository Insert 1 suppository (25 mg total) into the rectum 2 (two) times a day for 10 days. 24 suppository 0    losartan (COZAAR) 50 mg tablet Take 50 mg by mouth daily.      NIFEdipine 0.2 % ointment Apply 3 times daily as directed 60 g 0    omeprazole 20 mg tablet Take 1 tablet by mouth daily.      ibuprofen (MOTRIN) 200 mg tablet Take 200 mg by mouth every 6 hours as needed. (Patient not taking: Reported on 5/5/2025)      polyethylene glycol (MIRALAX) 17 gram/dose powder Take 17 g by mouth nightly. 510 g 5    psyllium (METAMUCIL, WITH SUGAR,) 3.4 gram packet Take 1 packet by mouth nightly. 30 packet 0    tamsulosin (FLOMAX) 0.4 mg capsule Take 1 capsule (0.4 mg total) by mouth nightly. 30 capsule 0     No current facility-administered medications for this visit.       Objective     Review of Systems:  All other systems reviewed and are negative.    Physicial Exam  Visit Vitals  /69 (BP Location: Left upper arm, Patient Position: Sitting)   Pulse 76   Ht 1.803 m (5' 11\")   Wt 99.8 kg (220 lb)   BMI 30.68 kg/m²       Physical Exam  Genitourinary:     Comments: The fissure site posteriorly appears to be completely healed.  There is no sentinel tag.  I cannot see any wound at the sphincterotomy site.  On digital examination he seems to be tender in the mid anal canal on the right side.  I do not see any sign of purulence.  I was able to do a anoscopy.  There are some slight hemorrhoidal bleeding at the top of the anal canal but as I pulled in the " anal canal I was not able to see any sign of a persistent wound at the sphincterotomy site or the fissure site.        Physical Exam  The scar from the fissure in the gastrointestinal area is nicely healed. The sphincterotomy site appears to be healing well externally. There is tenderness on the right side at the sphincterotomy site. No wound, pus, drainage, or discharge is observed. A small amount of blood is noted where the internal hemorrhoids are located.      Results        Assessment     Assessment & Plan  1. Post-sphincterotomy status.  He continues to experience discomfort during bowel movements, despite the absence of a visible wound. The fissure has healed, and the sphincterotomy site appears to be healing well externally. However, there is tenderness on the right side at the sphincterotomy site. An anoscopy revealed no wound, pus, drainage, or discharge, but a small amount of blood was noted where the internal hemorrhoids are located. This is likely due to irritation from the anoscopy.     The cause of his persistent pain remains unclear. He is advised to discontinue nifedipine ointment as it does not seem to be providing relief. Hydrocortisone suppositories will be prescribed for insertion into the anal canal twice daily for 10 days. He should maintain soft stools and continue sitz baths after bowel movements. He is instructed to send a message in 2 weeks regarding any improvement in his condition.        Problem List Items Addressed This Visit          Digestive    Anal fissure - Primary    Relevant Medications    hydrocortisone (ANUSOL-HC) 25 mg suppository             Efraín Campo MD

## 2025-08-04 NOTE — LETTER
August 4, 2025     Rhett Alonzo MD  [address] [facility] [address]    Patient: Rose Ortez  YOB: 1960  Date of Visit: 8/4/2025      Dear Dr. Alonzo:    Thank you for referring Rose Ortez to me for evaluation. Below are my notes for this consultation.    If you have questions, please do not hesitate to call me. I look forward to following your patient along with you.         Sincerely,        Efraín Campo MD        CC: Efraín Molina MD  8/4/2025  5:05 PM  Sign when Signing Visit  Colorectal Surgery Follow-up      Consent obtained from patient and all parties present in the room? yes  I have obtained the consent of everyone present in the room to make an audio recording of this visit to assist me in documenting the encounter in the EMR.       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 He experienced severe pain after surgery and was found to have an infection at the sphincterotomy site, which was treated with incision and drainage (I & D) in the office on 05/05/2025. He was last seen on 06/19/2025, at which time the anal fissure was fully healed, but there was a persistent small wound at the sphincterotomy site. This wound was treated with silver nitrate, and he was advised to restart his nifedipine cream due to ongoing fissure-type pain during bowel movements.    He reports that his condition has not improved as expected, with persistent discomfort during bowel movements. The severity of his symptoms has decreased compared to the initial post-surgery period, but they remain consistent and occur with each bowel movement, lasting for several hours. He also experiences incomplete bowel movements, necessitating multiple visits to the bathroom, which further irritates the area and leads to bleeding. He continues to use  nifedipine ointment twice daily, sometimes three times if not working. He has not used hydrocortisone suppositories in the past. Physical activities do not exacerbate his discomfort. He manages his discomfort with sitz baths and occasional Advil.    MEDICATIONS  Current: nifedipine cream, Advil    Medical History:   Past Medical History:   Diagnosis Date   • Anal fissure    • GERD (gastroesophageal reflux disease)    • Hypertension        Surgical History:   Past Surgical History   Procedure Laterality Date   • Anal fistulotomy     • ANAL SPHINCTEROTOMY N/A 4/30/2025    [place] [facility] [place]   • Cervical spine surgery     • Colonoscopy         Allergies: Patient has no known allergies.    Current Medications:  Current Outpatient Medications   Medication Sig Dispense Refill   • acetaminophen (TYLENOL) 325 mg tablet Take 650 mg by mouth every 6 (six) hours as needed.     • hydrochlorothiazide (HYDRODIURIL) 50 mg tablet Take 50 mg by mouth daily.     • hydrocortisone (ANUSOL-HC) 25 mg suppository Insert 1 suppository (25 mg total) into the rectum 2 (two) times a day for 10 days. 24 suppository 0   • losartan (COZAAR) 50 mg tablet Take 50 mg by mouth daily.     • NIFEdipine 0.2 % ointment Apply 3 times daily as directed 60 g 0   • omeprazole 20 mg tablet Take 1 tablet by mouth daily.     • ibuprofen (MOTRIN) 200 mg tablet Take 200 mg by mouth every 6 hours as needed. (Patient not taking: Reported on 5/5/2025)     • polyethylene glycol (MIRALAX) 17 gram/dose powder Take 17 g by mouth nightly. 510 g 5   • psyllium (METAMUCIL, WITH SUGAR,) 3.4 gram packet Take 1 packet by mouth nightly. 30 packet 0   • tamsulosin (FLOMAX) 0.4 mg capsule Take 1 capsule (0.4 mg total) by mouth nightly. 30 capsule 0     No current facility-administered medications for this visit.       Objective    Review of Systems:  All other systems reviewed and are negative.    Physicial Exam  Visit Vitals  /69 (BP Location:  "Left upper arm, Patient Position: Sitting)   Pulse 76   Ht 1.803 m (5' 11\")   Wt 99.8 kg (220 lb)   BMI 30.68 kg/m²       Physical Exam  Genitourinary:     Comments: The fissure site posteriorly appears to be completely healed.  There is no sentinel tag.  I cannot see any wound at the sphincterotomy site.  On digital examination he seems to be tender in the mid anal canal on the right side.  I do not see any sign of purulence.  I was able to do a anoscopy.  There are some slight hemorrhoidal bleeding at the top of the anal canal but as I pulled in the anal canal I was not able to see any sign of a persistent wound at the sphincterotomy site or the fissure site.        Physical Exam  The scar from the fissure in the gastrointestinal area is nicely healed. The sphincterotomy site appears to be healing well externally. There is tenderness on the right side at the sphincterotomy site. No wound, pus, drainage, or discharge is observed. A small amount of blood is noted where the internal hemorrhoids are located.      Results        Assessment    Assessment & Plan  1. Post-sphincterotomy status.  He continues to experience discomfort during bowel movements, despite the absence of a visible wound. The fissure has healed, and the sphincterotomy site appears to be healing well externally. However, there is tenderness on the right side at the sphincterotomy site. An anoscopy revealed no wound, pus, drainage, or discharge, but a small amount of blood was noted where the internal hemorrhoids are located. This is likely due to irritation from the anoscopy.     The cause of his persistent pain remains unclear. He is advised to discontinue nifedipine ointment as it does not seem to be providing relief. Hydrocortisone suppositories will be prescribed for insertion into the anal canal twice daily for 10 days. He should maintain soft stools and continue sitz baths after bowel movements. He is instructed to send a message in 2 weeks " regarding any improvement in his condition.        Problem List Items Addressed This Visit          Digestive    Anal fissure - Primary    Relevant Medications    hydrocortisone (ANUSOL-HC) 25 mg suppository             Efraín Campo MD

## 2025-08-14 ENCOUNTER — TELEPHONE (OUTPATIENT)
Dept: SURGERY | Facility: CLINIC | Age: 65
End: 2025-08-14
Payer: COMMERCIAL

## (undated) DEVICE — GLOVE SZ 7 LINER PROTEXIS PI BL

## (undated) DEVICE — TUBE SUCTION 1/4INX20FT STERILE

## (undated) DEVICE — SUTURE CHROMIC 3-0 GUT UNDYED 1X30 V-20

## (undated) DEVICE — MANIFOLD SINGLE PORT NEPTUNE

## (undated) DEVICE — SUTURE MONOSOF 4-0 BLACK 1X18 P-12

## (undated) DEVICE — PACK MINOR PROCEDURE TLH

## (undated) DEVICE — SUTURE POLYSORB 3-0 UNDYED 1X30 V-20

## (undated) DEVICE — TAPE DURAPORE 3IN

## (undated) DEVICE — DRAPE POUCH IRRIGATION

## (undated) DEVICE — LITE GLOVE 1 HANDLE COVER

## (undated) DEVICE — TRAY SKIN SCRUB

## (undated) DEVICE — BRIEFS MESH LARGE/EXTRA LARGE 2/PK

## (undated) DEVICE — SYRINGE DISP LUER-LOK 10 CC

## (undated) DEVICE — TUBING SMOKE EVAC PENCIL COATED

## (undated) DEVICE — UNDERPAD 24 X 17.5 STANDARD ABSORB DISBOSABLE

## (undated) DEVICE — DRESSING TELFA 3X8

## (undated) DEVICE — PAD GROUND ELECTROSURGICAL W/CORD

## (undated) DEVICE — SUTURE POLYSORB 0 VIOLET 1X30 GS-21

## (undated) DEVICE — SOLN IRRIG .9%SOD 1000ML

## (undated) DEVICE — SOLN BETADINE 4 OZ

## (undated) DEVICE — TIP SUCTION YANKAUER

## (undated) DEVICE — DRESSING COMBINE 5X9 STERILE

## (undated) DEVICE — VESSEL LOOP MAXI BLUE

## (undated) DEVICE — SPONGE HEMOSTATIC 8CM 12.5CM 10MM THICK

## (undated) DEVICE — GAUZE 8X4 16 PLY RFID DOUBLE XRAY

## (undated) DEVICE — LEGGINGS 31X48 PAIR ST 20/CS

## (undated) DEVICE — GLOVE SURG PROTEXIS PF 7.0 2D72NS70X